# Patient Record
Sex: FEMALE | Race: BLACK OR AFRICAN AMERICAN | Employment: FULL TIME | URBAN - METROPOLITAN AREA
[De-identification: names, ages, dates, MRNs, and addresses within clinical notes are randomized per-mention and may not be internally consistent; named-entity substitution may affect disease eponyms.]

---

## 2017-06-16 ENCOUNTER — TELEPHONE (OUTPATIENT)
Dept: NEUROLOGY | Age: 37
End: 2017-06-16

## 2017-06-16 NOTE — TELEPHONE ENCOUNTER
Amrita says the patient is being referred by Vicky Steward to Dr. Dellie Cushing, she needs the last couple of notes and any lab/radiology reports faxed to [phone]. (I would have done this myself but I don't see an order and this patient hasn't been seen here since last year) Please advise.

## 2018-06-20 ENCOUNTER — HOSPITAL ENCOUNTER (OUTPATIENT)
Dept: ULTRASOUND IMAGING | Age: 38
Discharge: HOME | End: 2018-06-20
Attending: INTERNAL MEDICINE
Payer: COMMERCIAL

## 2018-06-20 DIAGNOSIS — R10.13 DYSPEPSIA: ICD-10-CM

## 2018-06-20 DIAGNOSIS — R11.0 NAUSEA: ICD-10-CM

## 2018-06-20 PROCEDURE — 76700 US EXAM ABDOM COMPLETE: CPT

## 2018-09-24 ENCOUNTER — OFFICE VISIT (OUTPATIENT)
Dept: NEUROLOGY | Age: 38
End: 2018-09-24

## 2018-09-24 VITALS
SYSTOLIC BLOOD PRESSURE: 132 MMHG | HEIGHT: 62 IN | DIASTOLIC BLOOD PRESSURE: 80 MMHG | WEIGHT: 203.9 LBS | BODY MASS INDEX: 37.52 KG/M2 | HEART RATE: 88 BPM | OXYGEN SATURATION: 98 %

## 2018-09-24 DIAGNOSIS — G51.31 CLONIC HEMIFACIAL SPASM OF MUSCLE OF RIGHT SIDE OF FACE: ICD-10-CM

## 2018-09-24 DIAGNOSIS — R25.9 ABNORMAL INVOLUNTARY MOVEMENT: Primary | ICD-10-CM

## 2018-09-24 PROBLEM — E66.01 SEVERE OBESITY (BMI 35.0-39.9): Status: ACTIVE | Noted: 2018-09-24

## 2018-09-24 RX ORDER — ALPRAZOLAM 0.25 MG/1
0.25 TABLET ORAL
Qty: 40 TAB | Refills: 0 | Status: SHIPPED | OUTPATIENT
Start: 2018-09-24 | End: 2020-10-01

## 2018-09-24 NOTE — MR AVS SNAPSHOT
[address] [place] [address], 
[id number], [address] [facility] 
[phone] Patient: Vertie Dubin MRN: [id number] :1980 Visit Information Date & Time Provider Department Dept. Phone Encounter #  
 2018  1:40 PM Onel Reynoso MD [facility] [phone] [id number] Follow-up Instructions Return if symptoms worsen or fail to improve, for abnormal movements. Upcoming Health Maintenance Date Due DTaP/Tdap/Td series (1 - Tdap) 10/23/2000 Influenza Age 5 to Adult 2018 PAP AKA CERVICAL CYTOLOGY 2019 Allergies as of 2018  Review Complete On: 2018 By: Ailin Norton Severity Noted Reaction Type Reactions Nubain [Nalbuphine]  2011    Swelling Of throat and tongue Phenergan [Promethazine]  2011    Swelling Caused swelling tongue and throat Stadol [Butorphanol Tartrate]  2011    Swelling Of tongue and throat Current Immunizations  Never Reviewed No immunizations on file. Not reviewed this visit You Were Diagnosed With   
  
 Codes Comments Abnormal involuntary movement    -  Primary ICD-10-CM: R25.9 ICD-9-CM: 460. 0 Clonic hemifacial spasm of muscle of right side of face     ICD-10-CM: G51.3 ICD-9-CM: 351.8 Vitals BP Pulse Height(growth percentile) Weight(growth percentile) SpO2 BMI  
 132/80 88 5' 2\" (1.575 m) 203 lb 14.4 oz (92.5 kg) 98% 37.29 kg/m2 OB Status Smoking Status Having regular periods Never Smoker BMI and BSA Data Body Mass Index Body Surface Area  
 37.29 kg/m 2 2.01 m 2 Preferred Pharmacy Pharmacy Name Phone [facility] [address] [place] [address] Drive [phone] Your Updated Medication List  
  
   
 This list is accurate as of 9/24/18  2:21 PM.  Always use your most recent med list.  
  
  
  
  
 ALPRAZolam 0.25 mg tablet Commonly known as:  Kaleta Annel Take 1 Tab by mouth three (3) times daily as needed. Max Daily Amount: 0.75 mg.  
  
 aspirin-acetaminophen-caffeine 250-250-65 mg per tablet Commonly known as:  EXCEDRIN ES Take 1 Tab by mouth every six (6) hours as needed for Headache. Blood-Glucose Meter monitoring kit Commonly known as:  [address]   [address] KIT Check fasting morning blood sugar once/day  
  
 butalbital-aspirin-caffeine capsule Commonly known as:  Donte Francis Take 1 Cap by mouth every six (6) hours as needed for Headache. Max Daily Amount: 4 Caps. fluticasone 50 mcg/actuation nasal spray Commonly known as:  Martell Bolivar 2 Sprays by Both Nostrils route daily. Indications: ALLERGIC RHINITIS  
  
 glucose blood VI test strips strip Commonly known as:  ONETOUCH ULTRA TEST Check fasting morning blood sugar once/day * Lancets Misc Commonly known as:  FREESTYLE LANCETS Check fasting sugar in the morning one time/day. * Lancets Misc Commonly known as:  ONETOUCH ULTRASOFT LANCETS Check fasting morning blood sugar once/day. metFORMIN 500 mg tablet Commonly known as:  GLUCOPHAGE Take 1 Tab by mouth two (2) times daily (with meals). Indications: TYPE 2 DIABETES MELLITUS  
  
 ondansetron 8 mg disintegrating tablet Commonly known as:  ZOFRAN ODT Take 1 Tab by mouth every twelve (12) hours as needed for Nausea. pantoprazole 40 mg tablet Commonly known as:  PROTONIX Take 1 Tab by mouth daily. * Notice: This list has 2 medication(s) that are the same as other medications prescribed for you. Read the directions carefully, and ask your doctor or other care provider to review them with you. Prescriptions Printed Refills  ALPRAZolam (XANAX) 0.25 mg tablet 0  
 Sig: Take 1 Tab by mouth three (3) times daily as needed. Max Daily Amount: 0.75 mg. Class: Print Route: Oral  
  
We Performed the Following REFERRAL TO NEUROLOGY [ZOS98 Custom] Comments:  
 Movement disorder specialist for involuntary movements or dystonia affecting right face, right arm/leg and back. Follow-up Instructions Return if symptoms worsen or fail to improve, for abnormal movements. Referral Information Referral ID Referred By Referred To  
  
 [id number] Sarah SALCEDO Not Available Visits Status Start Date End Date 1 New Request 9/24/18 9/24/19 If your referral has a status of pending review or denied, additional information will be sent to support the outcome of this decision. Patient Instructions A Healthy Lifestyle: Care Instructions Your Care Instructions A healthy lifestyle can help you feel good, stay at a healthy weight, and have plenty of energy for both work and play. A healthy lifestyle is something you can share with your whole family. A healthy lifestyle also can lower your risk for serious health problems, such as high blood pressure, heart disease, and diabetes. You can follow a few steps listed below to improve your health and the health of your family. Follow-up care is a key part of your treatment and safety. Be sure to make and go to all appointments, and call your doctor if you are having problems. It's also a good idea to know your test results and keep a list of the medicines you take. How can you care for yourself at home? · Do not eat too much sugar, fat, or fast foods. You can still have dessert and treats now and then. The goal is moderation. · Start small to improve your eating habits. Pay attention to portion sizes, drink less juice and soda pop, and eat more fruits and vegetables. ¨ Eat a healthy amount of food.  A 3-ounce serving of meat, for example, is about the size of a deck of cards. Fill the rest of your plate with vegetables and whole grains. ¨ Limit the amount of soda and sports drinks you have every day. Drink more water when you are thirsty. ¨ Eat at least 5 servings of fruits and vegetables every day. It may seem like a lot, but it is not hard to reach this goal. A serving or helping is 1 piece of fruit, 1 cup of vegetables, or 2 cups of leafy, raw vegetables. Have an apple or some carrot sticks as an afternoon snack instead of a candy bar. Try to have fruits and/or vegetables at every meal. 
· Make exercise part of your daily routine. You may want to start with simple activities, such as walking, bicycling, or slow swimming. Try to be active 30 to 60 minutes every day. You do not need to do all 30 to 60 minutes all at once. For example, you can exercise 3 times a day for 10 or 20 minutes. Moderate exercise is safe for most people, but it is always a good idea to talk to your doctor before starting an exercise program. 
· Keep moving. Parminder Sang the lawn, work in the garden, or [employer]. Take the stairs instead of the elevator at work. · If you smoke, quit. People who smoke have an increased risk for heart attack, stroke, cancer, and other lung illnesses. Quitting is hard, but there are ways to boost your chance of quitting tobacco for good. ¨ Use nicotine gum, patches, or lozenges. ¨ Ask your doctor about stop-smoking programs and medicines. ¨ Keep trying. In addition to reducing your risk of diseases in the future, you will notice some benefits soon after you stop using tobacco. If you have shortness of breath or asthma symptoms, they will likely get better within a few weeks after you quit. · Limit how much alcohol you drink. Moderate amounts of alcohol (up to 2 drinks a day for men, 1 drink a day for women) are okay. But drinking too much can lead to liver problems, high blood pressure, and other health problems. Family health If you have a family, there are many things you can do together to improve your health. · Eat meals together as a family as often as possible. · Eat healthy foods. This includes fruits, vegetables, lean meats and dairy, and whole grains. · Include your family in your fitness plan. Most people think of activities such as jogging or tennis as the way to fitness, but there are many ways you and your family can be more active. Anything that makes you breathe hard and gets your heart pumping is exercise. Here are some tips: 
¨ Walk to do errands or to take your child to school or the bus. ¨ Go for a family bike ride after dinner instead of watching TV. Where can you learn more? Go to http://ok-keri.info/. Enter Z540 in the search box to learn more about \"A Healthy Lifestyle: Care Instructions. \" Current as of: December 7, 2017 Content Version: 11.7 © 2299-9916 [employer]. Care instructions adapted under license by [employer] (which disclaims liability or warranty for this information). If you have questions about a medical condition or this instruction, always ask your healthcare professional. [address] any warranty or liability for your use of this information. Introducing [facility]! Dear Mark Mehta: Thank you for requesting a [employer] account. Our records indicate that you already have an active [employer] account. You can access your account anytime at [email or web address] Did you know that you can access your hospital and ER discharge instructions at any time in [employer]? You can also review all of your test results from your hospital stay or ER visit. Additional Information If you have questions, please visit the Frequently Asked Questions section of the [employer] website at [email or web address] Remember, [employer] is NOT to be used for urgent needs.  For medical emergencies, dial 911. Now available from your iPhone and Android! Please provide this summary of care documentation to your next provider. Your primary care clinician is listed as Olvin Cm. If you have any questions after today's visit, please call [phone].

## 2018-09-24 NOTE — PATIENT INSTRUCTIONS

## 2018-09-24 NOTE — PROGRESS NOTES
NEUROLOGY CLINIC NOTE Patient ID: 
Ana M Degroot [id number] 
44 y.o. 
1980 Date of Consultation:  September 24, 2018 Reason for Consultation:  Establish care Chief Complaint Patient presents with  Follow-up Face/spine back History of Present Illness:  
 
Patient Active Problem List  
 Diagnosis Date Noted  Severe obesity (BMI 35.0-39.9) ([facility] 09/24/2018  Hyperlipidemia LDL goal <70 10/03/2016  Precordial pain 07/18/2016  TIA (transient ischemic attack) 06/07/2016  Dyslipidemia 06/05/2013  Weight gain 05/22/2013  Elbow pain 05/22/2013  Impacted cerumen of right ear 05/22/2013  Hearing loss 05/22/2013  Dyspnea 06/15/2011  Anxiety 06/15/2011 Past Medical History:  
Diagnosis Date  Diabetes ([facility]  Hyperlipidemia  TIA (transient ischemic attack) 6/7/2016 Past Surgical History:  
Procedure Laterality Date  HX TUBAL LIGATION  2009  HX WISDOM TEETH EXTRACTION Prior to Admission medications Medication Sig Start Date End Date Taking? Authorizing Provider  
metFORMIN (GLUCOPHAGE) 500 mg tablet Take 1 Tab by mouth two (2) times daily (with meals). Indications: TYPE 2 DIABETES MELLITUS 10/3/16  Yes Marni Ryan MD  
Lancets (FREESTYLE LANCETS) misc Check fasting sugar in the morning one time/day. 10/3/16  Yes Olvin Cm MD  
Lancets (ONETOUCH ULTRASOFT LANCETS) misc Check fasting morning blood sugar once/day. 10/3/16  Yes Marni Ryan MD  
glucose blood VI test strips (ONETOUCH ULTRA TEST) strip Check fasting morning blood sugar once/day 10/3/16  Yes Marni Ryan MD  
pantoprazole (PROTONIX) 40 mg tablet Take 1 Tab by mouth daily. 9/23/16  Yes Olvin Cm MD  
ondansetron (ZOFRAN ODT) 8 mg disintegrating tablet Take 1 Tab by mouth every twelve (12) hours as needed for Nausea.  8/29/16  Yes Olvin Cm MD  
ALPRAZolam (XANAX) 0.25 mg tablet Take 1 Tab by mouth three (3) times daily as needed for Anxiety. Max Daily Amount: 0.75 mg. 6/30/16  Yes Miki Meyer NP  
butalbital-aspirin-caffeine [facility]) capsule Take 1 Cap by mouth every six (6) hours as needed for Headache. Max Daily Amount: 4 Caps. 6/7/16  Yes Vamshi Dasilva NP  
aspirin-acetaminophen-caffeine (EXCEDRIN ES) 155-188-36 mg per tablet Take 1 Tab by mouth every six (6) hours as needed for Headache. Yes Historical Provider  
fluticasone (FLONASE) 50 mcg/actuation nasal spray 2 Sprays by Both Nostrils route daily. Indications: ALLERGIC RHINITIS Patient taking differently: 2 Sprays by Both Nostrils route daily as needed. Indications: ALLERGIC RHINITIS 11/11/15  Yes Karina Victoria MD  
Blood-Glucose Meter (ONETOUCH ULTRA SYSTEM KIT) monitoring kit Check fasting morning blood sugar once/day 10/3/16   Karina Victoria MD  
 
Allergies Allergen Reactions  Nubain [Nalbuphine] Swelling Of throat and tongue  Phenergan [Promethazine] Swelling Caused swelling tongue and throat  Stadol [Butorphanol Tartrate] Swelling Of tongue and throat Social History Substance Use Topics  Smoking status: Never Smoker  Smokeless tobacco: Never Used  Alcohol use 0.0 oz/week  
  0 Standard drinks or equivalent per week Comment: Occasional  
  
Family History Problem Relation Age of Onset  Hypertension Mother  Elevated Lipids Mother  Neuropathy Mother  Headache Father  Hypertension Brother  Stroke Maternal Aunt  Hypertension Maternal Grandmother  Cancer Maternal Grandmother   
  throat cancer  Stroke Maternal Uncle Subjective:  
  
Jose Fowler is a 44 y.o. RHAA female with chronic history of migraine headaches who is here to establish her care regarding her right episodic 
face, neck and arm issues. Patient was last seen by me at [facility] neurology on 6/19/2017 for the same issues.  Review of records reveal that patient was seen previously by Dr. Alisa Wilkinson (neurology) last 6/9/2016. Follow up of possible TIA. History of menstrual migraines. Symptoms started 2 weeks prior. Gets a wave that overcomes her. Gets a flutter in her chest and shortness of breath. Dizziness, weakness R>L arms and fatigue. Tingling, twitching, right facial droop and slurred speech. Pain in the back of her neck. Problems swallowing. Admitted at [facility] 6/6/2017. Head CT was negative. Brain MRI with and without contrast done 6/7/2016 revealed no acute abnormality and incidentally noted an empty sella. Carotid doppler 6/7/2016 revealed <50% bilateral ICA stenosis. CBC reveal slightly low hgb (11.3), CMP showing low K (3.4) and slightly elevated LDL at 101.8. Slightly 
elevated hgbA1c at 6.4. Unremarkable troponin, D-dimer, HCG, EKG was normal. Discharged on Topiramate and Fioricet without any benefit. She apparently had a witnessed spell in her clinic. Patient became tearful, then right side of her face tightened up. Unable to raise her right eyebrow and her smile was asymmetric. Visibly anxious. Topamax was discontinued. Started on Gabapentin 300 mg at bedtime. Alprazolam as needed. 24 hours ambulatory EEG was done and normal except for muscle artifact. Cervical MRI was denied. Autoimmune and myasthenia gravis panel were negative. Seen by Hilda Lennon NP with [facility] last 6/30/2016 for follow up. Some benefit with Excedrin. Patient was also on Gabapentin 900 mg at bedtime and was not having anymore spells. She took Xanax when she got short of breath and it prevented the escalation of her symptoms. Note mentions waking up gasping for breath and snoring when she sleeps. Possible anxiety related. Trial of Lexapro and referral to psychiatry. Per patient after that she was better. Symptoms resolved. Then it recurred about a 5/2017. No known precipitant. Right facial asymmetry. Eyelid would droop and facial droop. Right neck pain tightness and back of the eye (pressure dull pain). Associated with speech changes. Feels like her throat is tight. Right arm pain. Right leg has a numb and heavy feeling. 5/10 in intensity pain. Lasts for 30 minutes. Afterwards she feels fatigued and tired the whole day. Symptoms can occur separately and not in toto. Like right now she only has right arm pain (deep ache) whole arm. She notes problems with use of the arm and hand. No actual loss of muscle bulk or atrophy however. Ibuprofen offers some relief. She also mentions episodes of waking up at night with both legs are numb. 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 Problems staying asleep. Feels the need to walk around for relief of her legs. Sleeps on the right side majority of the time. 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 Prescribed Baclofen 10 mg at bedtime and up to TID which she did not take. MRI of the neck and soft tissue 7/3/2017 for right facial pulling, throat closing, neck tightness and RUE pain/heaviness revealed \"Empty\" sella, borderline cerebellar tonsil position without overt Chiari I configuration. No significant abnormality along the aerodigestive structures. Limited evaluation of the muscular structures of the neck shows no overt abnormality. Muscular evaluation is limited in the absence of STIR or fatsuppressed T2 imaging. No evidence of adenopathy. She did not return for follow up visits. Per patient since then, she was okay with only mild spell of intermittent right eye closure until 9 days PTC with it all recurred again.  has a video of one of the spells. It consists of her right eyelid closing and right facial pulling with left eye open. Lasting for several seconds and wipes her right eye after.  Per patient she is currently having intermittent right arm and back pain. Right leg numbness and heaviness. Problems walking. Issues with swallowing and breathing causing problems with sleeping. Speech and cognitive function is off. Also note back of the head pain. She took Ajungo powder without relief. Spells mainly triggered when she is stressed or when it is too hot. Currently c/o 8/10 back pain. Outside reports reviewed: office notes, ER records, historical medical records, MRI, head CT, labs Review of Systems: A comprehensive review of systems was performed:  
Constitutional: positive for none Eyes: positive for blurred vision, eye pain Ears, nose, mouth, throat, and face: positive for ear pain, hearing loss, tinnitus, congestion Respiratory: positive for shortness of breath Cardiovascular: positive for chest pain Gastrointestinal: positive for nausea, constipation, heartburn, trouble swallowing Genitourinary: positive for none Integument/breast: positive for rash, itching Hematologic/lymphatic: positive for easy bruising Musculoskeletal: positive for joint pain, myalgia, leg cramps, weakness, bone pain, back pain Neurological: positive for headaches, numbness, dizziness, balance issues, speech issues Behavioral/Psych: positive for anxiety, depression, panic attacks Endocrine: positive for increased thirst 
Allergic/Immunologic: positive for none Objective:  
 
Visit Vitals  /80  Pulse 88  Ht 5' 2\" (1.575 m)  Wt 203 lb 14.4 oz (92.5 kg)  SpO2 98%  BMI 37.29 kg/m2 PHYSICAL EXAM: 
 
NEUROLOGICAL EXAM: 
 
Appearance: The patient is obese, provides a coherent history and is in no acute distress. Mental Status: Oriented to time, place and person. Fluent, no aphasia or dysarthria. Mood and affect appropriate. Cranial Nerves:   Intact visual fields. EDUARDO, EOM's full, no nystagmus, no ptosis. Facial sensation is normal. Corneal reflexes are intact.  Facial movement is symmetric. Hearing is normal bilaterally. Palate is midline with normal elevation. Sternocleidomastoid and trapezius muscles are normal. Tongue is midline. Motor:  5/5 strength. Normal bulk and tone. No pronator drift. Reflexes:   Deep tendon reflexes symmetrical.   
Sensory:   Intact. Gait:  Steady. No Romberg. Tremor:   No tremor noted. Cerebellar:  Intact FTN/KATRIN/HTS. Assessment:  
Abnormal involuntary movement - worsening Right hemifacial spasm - worsening Plan:  
Neurological examination is nonfocal.  No indication currently to do any other neuroimaging. Patient has had brain MRI with and without contrast done as well as MRI of the neck soft tissue which were unremarkable. Episodes are more suspicious for abnormal involuntary movements or conditions such as can be seen in dystonias. Summarize again for the patient that extensive workup previously done. Patient needs further evaluation from a movement disorder specialist to look for additional workup including possible genetic testing as well as treatment options. Patient was referred to movement specialist either with Dr. Salo Mccormack of neurological associates or [facility] movement disorders clinic. In the interim, suggested the patient to do symptomatic treatment with medication that may help with involuntary movements. Patient agreed. Trial of alprazolam 0.25 mg every 8 hours as needed. Prescription was provided. Other treatment options include Sinemet, dopamine agonists or antispasm medication such as baclofen and tizanidine. Visit he was shown reveals intermittent right hemifacial spasm. It was explained to the patient that unfortunately this does not seem to be occurring in isolation but may be part of a more extensive symptomatology. If truly isolated then treatment such as Botox may be an option. The movements are also suspicious of a more voluntary nature.   Given that stress may be a trigger one may also need to look at for possible nonphysiologic causes of her condition. All questions and concerns were answered.

## 2018-09-27 ENCOUNTER — TELEPHONE (OUTPATIENT)
Dept: NEUROLOGY | Age: 38
End: 2018-09-27

## 2018-09-27 NOTE — TELEPHONE ENCOUNTER
Spoke with patient's  Jose Quiles to let him known I have faxed the referral to Dr. Carrasco Speaks. Patient want to known the wait time and Dr. Francisco Tena office time frame is 4-6 weeks. I did let  known that [facility] time frame was out to February 1, 2019. Gave  the phone number and he understood.

## 2018-10-02 ENCOUNTER — DOCUMENTATION ONLY (OUTPATIENT)
Dept: NEUROLOGY | Age: 38
End: 2018-10-02

## 2018-10-02 NOTE — PROGRESS NOTES
Faxed on 9/26/2018 to Dr. Marycruz Orosco at [phone] Referral Requisition, Clinic Note, Insurance card image, Patient Registration.

## 2018-11-07 ENCOUNTER — TELEPHONE (OUTPATIENT)
Dept: NEUROLOGY | Age: 38
End: 2018-11-07

## 2018-11-07 NOTE — TELEPHONE ENCOUNTER
Patient is now under the care of Dr. Niya Ocampo ([facility]). Please have the patient request refills from him.

## 2018-11-07 NOTE — TELEPHONE ENCOUNTER
----- Message from Zeke Lowe sent at 11/7/2018 10:41 AM EST -----  Regarding: Dr Hermosillo Go refill  The pt called to put in a refill request for her \"alprazolam 0.25mg\" medication. The Rx should be sent to the [facility] on file.     Best contact number is [phone]

## 2018-11-07 NOTE — TELEPHONE ENCOUNTER
Spoke with patient and per Dr. Philly Ortiz patient is in the care of Dr. Orlando Brown ([facility]). Please have the patient request refill from him. Patient understood.

## 2018-11-21 ENCOUNTER — OFFICE VISIT (OUTPATIENT)
Dept: PRIMARY CARE CLINIC | Age: 38
End: 2018-11-21

## 2018-11-21 VITALS
TEMPERATURE: 98.6 F | OXYGEN SATURATION: 97 % | HEIGHT: 62 IN | HEART RATE: 76 BPM | WEIGHT: 194.2 LBS | RESPIRATION RATE: 16 BRPM | BODY MASS INDEX: 35.74 KG/M2 | SYSTOLIC BLOOD PRESSURE: 116 MMHG | DIASTOLIC BLOOD PRESSURE: 77 MMHG

## 2018-11-21 DIAGNOSIS — E11.9 TYPE 2 DIABETES MELLITUS WITHOUT COMPLICATION, WITHOUT LONG-TERM CURRENT USE OF INSULIN (HCC): Primary | ICD-10-CM

## 2018-11-21 DIAGNOSIS — K21.9 GASTROESOPHAGEAL REFLUX DISEASE WITHOUT ESOPHAGITIS: ICD-10-CM

## 2018-11-21 DIAGNOSIS — G51.39 FACIAL SPASM: ICD-10-CM

## 2018-11-21 LAB — HBA1C MFR BLD HPLC: 7.9 %

## 2018-11-21 RX ORDER — LANCETS
EACH MISCELLANEOUS
Qty: 1 EACH | Refills: 11 | Status: SHIPPED | OUTPATIENT
Start: 2018-11-21

## 2018-11-21 RX ORDER — LANSOPRAZOLE 30 MG/1
CAPSULE, DELAYED RELEASE ORAL
COMMUNITY
End: 2022-09-06 | Stop reason: SDUPTHER

## 2018-11-21 RX ORDER — INSULIN PUMP SYRINGE, 3 ML
EACH MISCELLANEOUS
Qty: 1 KIT | Refills: 0 | Status: SHIPPED | OUTPATIENT
Start: 2018-11-21 | End: 2019-02-05 | Stop reason: SDUPTHER

## 2018-11-21 RX ORDER — METFORMIN HYDROCHLORIDE 500 MG/1
TABLET, EXTENDED RELEASE ORAL
Qty: 60 TAB | Refills: 2 | Status: SHIPPED | OUTPATIENT
Start: 2018-11-21 | End: 2018-12-17 | Stop reason: SDUPTHER

## 2018-11-21 RX ORDER — NORETHINDRONE ACETATE AND ETHINYL ESTRADIOL, ETHINYL ESTRADIOL AND FERROUS FUMARATE 1MG-10(24)
KIT ORAL
Refills: 4 | COMMUNITY
Start: 2018-09-16 | End: 2022-03-10 | Stop reason: ALTCHOICE

## 2018-11-21 RX ORDER — TRIHEXYPHENIDYL HYDROCHLORIDE 2 MG/5ML
SYRUP ORAL
Refills: 11 | COMMUNITY
Start: 2018-10-26 | End: 2022-03-10 | Stop reason: ALTCHOICE

## 2018-11-21 NOTE — PROGRESS NOTES
Chief Complaint   Patient presents with    Complete Physical     ate boiled eggs at 830am    Diabetes     tightness in arm, vision is blurry, ears are popping, pt states she thinks her sugars are up again      1. Have you been to the ER, urgent care clinic since your last visit? Hospitalized since your last visit? Yes, tightness in left arm and chest, went to [facility] 11/19/18    2. Have you seen or consulted any other health care providers outside of the [address] [place] [address] since your last visit? Include any pap smears or colon screening.  No

## 2018-11-21 NOTE — PROGRESS NOTES
Subjective:     Chief Complaint   Patient presents with    Complete Physical     ate boiled eggs at 830am    Diabetes     tightness in arm, vision is blurry, ears are popping, pt states she thinks her sugars are up again         She  is a 43y.o. year old female who presents today after two years for follow up on diabetes. On 10/2016 she was diagnosed with DM-2 with A1c of 7.5. She reports that she  took Metformin for a month only then she stopped. She is here today with a concern about feeling tired, achy ness, increased thirst and urination. She also reports that she has been having blurry vision as well. Eye exam was normal per patient. She is currently seeing movement disorder specialist Dr. Raul Ball. I reviewed the neurology notes in the chart. Patient reports that she is not having that much twitching spells in her right side of the face since she started taking Artane and Xanax. GERD symptoms been well controlled with Lansoprazole. .    She denies any chest pain, cough, palpitation, abdominal pain. Positive for achy ness in back, shoulder. Pertinent items are noted in HPI.   Objective:     Vitals:    11/21/18 1336   BP: 116/77   Pulse: 76   Resp: 16   Temp: 98.6 °F (37 °C)   TempSrc: Oral   SpO2: 97%   Weight: 194 lb 3.2 oz (88.1 kg)   Height: 5' 2\" (1.575 m)       Physical Examination: General appearance - alert, well appearing, and in no distress, oriented to person, place, and time and overweight  Mental status - alert, oriented to person, place, and time, normal mood, behavior, speech, dress, motor activity, and thought processes  Ears - bilateral TM's and external ear canals normal  Nose - normal and patent, no erythema, discharge or polyps  Mouth - mucous membranes moist, pharynx normal without lesions  Neck - supple, no significant adenopathy, thyroid exam: thyroid is normal in size without nodules or tenderness  Chest - clear to auscultation, no wheezes, rales or rhonchi, symmetric air entry  Heart - normal rate, regular rhythm, normal S1, S2, no murmurs, rubs, clicks or gallops  Extremities - no pedal edema noted  Neuro: no focal asymmetry. Allergies   Allergen Reactions    Nubain [Nalbuphine] Swelling     Of throat and tongue    Phenergan [Promethazine] Swelling     Caused swelling tongue and throat    Stadol [Butorphanol Tartrate] Swelling     Of tongue and throat      Social History     Socioeconomic History    Marital status:      Spouse name: Not on file    Number of children: Not on file    Years of education: Not on file    Highest education level: Not on file   Tobacco Use    Smoking status: Never Smoker    Smokeless tobacco: Never Used   Substance and Sexual Activity    Alcohol use: Yes     Alcohol/week: 0.0 oz     Comment: Occasional    Drug use: No    Sexual activity: Yes     Partners: Male     Birth control/protection: Surgical      Family History   Problem Relation Age of Onset    Hypertension Mother     Elevated Lipids Mother     Neuropathy Mother     Headache Father     Hypertension Brother     Stroke Maternal Aunt     Hypertension Maternal Grandmother     Cancer Maternal Grandmother         throat cancer    Stroke Maternal Uncle       Past Surgical History:   Procedure Laterality Date    HX TUBAL LIGATION  2009    HX WISDOM TEETH EXTRACTION        Past Medical History:   Diagnosis Date    [facility]     Hyperlipidemia     TIA (transient ischemic attack) 6/7/2016      Current Outpatient Medications   Medication Sig Dispense Refill    LO LOESTRIN FE 1 mg-10 mcg (24)/10 mcg (2) tab TK 1 T PO QD  4    trihexyphenidyl (ARTANE) 0.4 mg/mL elix TK 5 ML PO BID  11    lansoprazole (PREVACID) 30 mg capsule Take  by mouth Daily (before breakfast).  metFORMIN ER (GLUCOPHAGE XR) 500 mg tablet One tab daily with breakfast and dinner. 60 Tab 2    ALPRAZolam (XANAX) 0.25 mg tablet Take 1 Tab by mouth three (3) times daily as needed. Max Daily Amount: 0.75 mg. 40 Tab 0    butalbital-aspirin-caffeine (FIORINAL) capsule Take 1 Cap by mouth every six (6) hours as needed for Headache. Max Daily Amount: 4 Caps. 20 Cap 0    aspirin-acetaminophen-caffeine (EXCEDRIN ES) 250-250-65 mg per tablet Take 1 Tab by mouth every six (6) hours as needed for Headache.  fluticasone (FLONASE) 50 mcg/actuation nasal spray 2 Sprays by Both Nostrils route daily. Indications: ALLERGIC RHINITIS (Patient taking differently: 2 Sprays by Both Nostrils route daily as needed. Indications: ALLERGIC RHINITIS) 1 Bottle 2    Lancets (FREESTYLE LANCETS) misc Check fasting sugar in the morning one time/day. 1 Each 11    Lancets (ONETOUCH ULTRASOFT LANCETS) misc Check fasting morning blood sugar once/day. 1 Each 11    Blood-Glucose Meter (ONETOUCH ULTRA SYSTEM KIT) monitoring kit Check fasting morning blood sugar once/day 1 Kit 0    glucose blood VI test strips (ONETOUCH ULTRA TEST) strip Check fasting morning blood sugar once/day 60 Strip 11    ondansetron (ZOFRAN ODT) 8 mg disintegrating tablet Take 1 Tab by mouth every twelve (12) hours as needed for Nausea. 10 Tab 0        Assessment/ Plan:   Diagnoses and all orders for this visit:    1. Type 2 diabetes mellitus without complication, without long-term current use of insulin (HCC)  -     AMB POC HEMOGLOBIN A1C is 7.9.  -    Start  metFORMIN ER (GLUCOPHAGE XR) 500 mg tablet; One tab daily with breakfast and dinner.  -     METABOLIC PANEL, BASIC  -     MICROALBUMIN, UR, RAND W/ MICROALB/CREAT RATIO  -     glucose blood VI test strips (ONETOUCH ULTRA TEST) strip; Check fasting morning blood sugar once/day  -     Blood-Glucose Meter (ONETOUCH ULTRA SYSTEM KIT) monitoring kit; Check fasting morning blood sugar once/day  -     Lancets misc; Check fasting sugar in the morning one time/day. -       Advised for yearly eye check up         -    foot care. - counseled of diet and exercise.         - will check lipid panel in next visit. 2. Gastroesophageal reflux disease without esophagitis       - stable with Lansoprazole. Avoid any triggering factors. 3. Facial spasm       - Stable at this point. Continue follow up with Dr. Ctaarino Dunn. Medication risks/benefits/costs/interactions/alternatives discussed with patient. Advised patient to call back or return to office if symptoms worsen/change/persist. If patient cannot reach us or should anything more severe/urgent arise he/she should proceed directly to the nearest emergency department. Discussed expected course/resolution/complications of diagnosis in detail with patient. Patient given a written after visit summary which includes her diagnoses, current medications and vitals. Patient expressed understanding with the diagnosis and plan. Follow-up Disposition:  Return in about 4 weeks (around 12/19/2018) for complete physical  and fasting blood work., have fasting blood work done 2-3 days prior. .    Discussed the patient's BMI with her. The BMI follow up plan is as follows:     dietary management education, guidance, and counseling  encourage exercise  monitor weight      An After Visit Summary was printed and given to the patient.

## 2018-11-22 PROBLEM — K21.9 GASTROESOPHAGEAL REFLUX DISEASE WITHOUT ESOPHAGITIS: Status: ACTIVE | Noted: 2018-11-22

## 2018-11-22 LAB
ALBUMIN/CREAT UR: <6.5 MG/G CREAT (ref 0–30)
BUN SERPL-MCNC: 9 MG/DL (ref 6–20)
BUN/CREAT SERPL: 14 (ref 9–23)
CALCIUM SERPL-MCNC: 9.3 MG/DL (ref 8.7–10.2)
CHLORIDE SERPL-SCNC: 100 MMOL/L (ref 96–106)
CO2 SERPL-SCNC: 24 MMOL/L (ref 20–29)
CREAT SERPL-MCNC: 0.64 MG/DL (ref 0.57–1)
CREAT UR-MCNC: 45.9 MG/DL
GLUCOSE SERPL-MCNC: 101 MG/DL (ref 65–99)
MICROALBUMIN UR-MCNC: <3 UG/ML
POTASSIUM SERPL-SCNC: 4 MMOL/L (ref 3.5–5.2)
SODIUM SERPL-SCNC: 138 MMOL/L (ref 134–144)

## 2018-11-22 NOTE — PATIENT INSTRUCTIONS
Body Mass Index: Care Instructions  Your Care Instructions    Body mass index (BMI) can help you see if your weight is raising your risk for health problems. It uses a formula to compare how much you weigh with how tall you are. · A BMI lower than 18.5 is considered underweight. · A BMI between 18.5 and 24.9 is considered healthy. · A BMI between 25 and 29.9 is considered overweight. A BMI of 30 or higher is considered obese. If your BMI is in the normal range, it means that you have a lower risk for weight-related health problems. If your BMI is in the overweight or obese range, you may be at increased risk for weight-related health problems, such as high blood pressure, heart disease, stroke, arthritis or joint pain, and diabetes. If your BMI is in the underweight range, you may be at increased risk for health problems such as fatigue, lower protection (immunity) against illness, muscle loss, bone loss, hair loss, and hormone problems. BMI is just one measure of your risk for weight-related health problems. You may be at higher risk for health problems if you are not active, you eat an unhealthy diet, or you drink too much alcohol or use tobacco products. Follow-up care is a key part of your treatment and safety. Be sure to make and go to all appointments, and call your doctor if you are having problems. It's also a good idea to know your test results and keep a list of the medicines you take. How can you care for yourself at home? · Practice healthy eating habits. This includes eating plenty of fruits, vegetables, whole grains, lean protein, and low-fat dairy. · If your doctor recommends it, get more exercise. Walking is a good choice. Bit by bit, increase the amount you walk every day. Try for at least 30 minutes on most days of the week. · Do not smoke. Smoking can increase your risk for health problems. If you need help quitting, talk to your doctor about stop-smoking programs and medicines. These can increase your chances of quitting for good. · Limit alcohol to 2 drinks a day for men and 1 drink a day for women. Too much alcohol can cause health problems. If you have a BMI higher than 25  · Your doctor may do other tests to check your risk for weight-related health problems. This may include measuring the distance around your waist. A waist measurement of more than 40 inches in men or 35 inches in women can increase the risk of weight-related health problems. · Talk with your doctor about steps you can take to stay healthy or improve your health. You may need to make lifestyle changes to lose weight and stay healthy, such as changing your diet and getting regular exercise. If you have a BMI lower than 18.5  · Your doctor may do other tests to check your risk for health problems. · Talk with your doctor about steps you can take to stay healthy or improve your health. You may need to make lifestyle changes to gain or maintain weight and stay healthy, such as getting more healthy foods in your diet and doing exercises to build muscle. Where can you learn more? Go to http://ok-keri.info/. Enter S176 in the search box to learn more about \"Body Mass Index: Care Instructions. \"  Current as of: October 13, 2016  Content Version: 11.4  © 9298-7613 [employer]. Care instructions adapted under license by [employer] (which disclaims liability or warranty for this information). If you have questions about a medical condition or this instruction, always ask your healthcare professional. [address] any warranty or liability for your use of this information.

## 2018-11-23 NOTE — PROGRESS NOTES
Please call patient and  mail letter:    1. Kidney function is normal.    2. Urine is negative for any protein. Follow up as scheduled in December.

## 2018-12-21 ENCOUNTER — OFFICE VISIT (OUTPATIENT)
Dept: PRIMARY CARE CLINIC | Age: 38
End: 2018-12-21

## 2018-12-21 VITALS
SYSTOLIC BLOOD PRESSURE: 127 MMHG | TEMPERATURE: 98.7 F | BODY MASS INDEX: 35.88 KG/M2 | RESPIRATION RATE: 16 BRPM | HEART RATE: 75 BPM | DIASTOLIC BLOOD PRESSURE: 83 MMHG | HEIGHT: 62 IN | OXYGEN SATURATION: 97 % | WEIGHT: 195 LBS

## 2018-12-21 DIAGNOSIS — E11.9 TYPE 2 DIABETES MELLITUS WITHOUT COMPLICATION, WITHOUT LONG-TERM CURRENT USE OF INSULIN (HCC): ICD-10-CM

## 2018-12-21 DIAGNOSIS — Z00.00 WELL ADULT ON ROUTINE HEALTH CHECK: Primary | ICD-10-CM

## 2018-12-21 DIAGNOSIS — J30.89 OTHER ALLERGIC RHINITIS: ICD-10-CM

## 2018-12-21 RX ORDER — METFORMIN HYDROCHLORIDE 500 MG/1
500 TABLET, EXTENDED RELEASE ORAL
Qty: 270 TAB | Refills: 0 | Status: SHIPPED | OUTPATIENT
Start: 2018-12-21 | End: 2020-05-27

## 2018-12-21 RX ORDER — FLUTICASONE PROPIONATE 50 MCG
2 SPRAY, SUSPENSION (ML) NASAL
Qty: 1 BOTTLE | Refills: 1 | Status: SHIPPED | OUTPATIENT
Start: 2018-12-21 | End: 2022-09-06 | Stop reason: SDUPTHER

## 2018-12-21 NOTE — PROGRESS NOTES
Chief Complaint   Patient presents with    Complete Physical     fasting, labs not done prior     1. Have you been to the ER, urgent care clinic since your last visit? Hospitalized since your last visit? No    2. Have you seen or consulted any other health care providers outside of the [address] [place] [address] since your last visit? Include any pap smears or colon screening.  No

## 2018-12-21 NOTE — PROGRESS NOTES
Subjective:   43 y.o. female for Well Woman Check as well as diabetes follow up. One month ago she was diagnosed with Metformin 500 mg BID. A1c was 7.9. She reports that FBS has been running upper 120-140s. Has been tolerating the metformin. No side effects noted. States that she has started going to diabetic educator. Pap is up to date. Need refill of Flonase. Patient Active Problem List   Diagnosis Code    Dyspnea R06.00    Anxiety F41.9    Weight gain R63.5    Elbow pain M25.529    Dyslipidemia E78.5    TIA (transient ischemic attack) G45.9    Precordial pain R07.2    Hyperlipidemia LDL goal <70 E78.5    Severe obesity (BMI 35.0-39. 9) E66.01    Type 2 diabetes mellitus without complication, without long-term current use of insulin (HCC) E11.9    Gastroesophageal reflux disease without esophagitis K21.9     Current Outpatient Medications   Medication Sig Dispense Refill    fluticasone (FLONASE) 50 mcg/actuation nasal spray 2 Sprays by Both Nostrils route daily as needed. 1 Bottle 1    metFORMIN ER (GLUCOPHAGE XR) 500 mg tablet Take 1 Tab by mouth three (3) times daily (with meals). TAKE 1 TABLET BY MOUTH TWICE DAILY WITH BREAKFAST AND DINNER 270 Tab 0    LO LOESTRIN FE 1 mg-10 mcg (24)/10 mcg (2) tab TK 1 T PO QD  4    lansoprazole (PREVACID) 30 mg capsule Take  by mouth Daily (before breakfast).  butalbital-aspirin-caffeine (FIORINAL) capsule Take 1 Cap by mouth every six (6) hours as needed for Headache. Max Daily Amount: 4 Caps. 20 Cap 0    aspirin-acetaminophen-caffeine (EXCEDRIN ES) 250-250-65 mg per tablet Take 1 Tab by mouth every six (6) hours as needed for Headache.       trihexyphenidyl (ARTANE) 0.4 mg/mL elix TK 5 ML PO BID  11    glucose blood VI test strips (ONETOUCH ULTRA TEST) strip Check fasting morning blood sugar once/day 60 Strip 11    Blood-Glucose Meter (ONETOUCH ULTRA SYSTEM KIT) monitoring kit Check fasting morning blood sugar once/day 1 Kit 0    Lancets misc Check fasting sugar in the morning one time/day. 1 Each 11    ALPRAZolam (XANAX) 0.25 mg tablet Take 1 Tab by mouth three (3) times daily as needed. Max Daily Amount: 0.75 mg. 40 Tab 0    Lancets (ONETOUCH ULTRASOFT LANCETS) [facility] Check fasting morning blood sugar once/day. 1 Each 11    ondansetron (ZOFRAN ODT) 8 mg disintegrating tablet Take 1 Tab by mouth every twelve (12) hours as needed for Nausea. 10 Tab 0     Allergies   Allergen Reactions    Nubain [Nalbuphine] Swelling     Of throat and tongue    Phenergan [Promethazine] Swelling     Caused swelling tongue and throat    Stadol [Butorphanol Tartrate] Swelling     Of tongue and throat     Past Medical History:   Diagnosis Date    Diabetes ([address]     Hyperlipidemia     TIA (transient ischemic attack) 6/7/2016     Past Surgical History:   Procedure Laterality Date    HX TUBAL LIGATION  2009    HX WISDOM TEETH EXTRACTION       Family History   Problem Relation Age of Onset    Hypertension Mother    [facility] Elevated Lipids Mother     Neuropathy Mother     Headache Father     Hypertension Brother     Stroke Maternal Aunt     Hypertension Maternal Grandmother     Cancer Maternal Grandmother         throat cancer    Stroke Maternal Uncle      Social History     Tobacco Use    Smoking status: Never Smoker    Smokeless tobacco: Never Used   Substance Use Topics    Alcohol use: Yes     Alcohol/week: 0.0 oz     Comment: Occasional             ROS: Feeling generally well. No TIA's or unusual headaches, no dysphagia. No prolonged cough. No dyspnea or chest pain on exertion. No abdominal pain, change in bowel habits, black or bloody stools. No urinary tract symptoms. No new or unusual musculoskeletal symptoms. Specific concerns today: refer to HPI. Objective: The patient appears well, alert, oriented x 3, in no distress.   Visit Vitals  /83 (BP 1 Location: Left arm, BP Patient Position: Sitting)   Pulse 75   Temp 98.7 °F (37.1 °C) (Oral) Resp 16   Ht 5' 2\" (1.575 m)   Wt 195 lb (88.5 kg)   LMP 12/04/2018 (Within Days)   SpO2 97%   BMI 35.67 kg/m²     ENT normal.  Neck supple. No adenopathy or thyromegaly. EDUARDO. Lungs are clear, good air entry, no wheezes, rhonchi or rales. S1 and S2 normal, no murmurs, regular rate and rhythm. Abdomen soft without tenderness, guarding, mass or organomegaly. Extremities show no edema, normal peripheral pulses. Neurological is normal, no focal findings. Foot exam\" 2 + pulses. Both feet looks normal. Monofilament test is normal.  Breast and Pelvic exams are deferred. Assessment/Plan:   Well Woman  lose weight, increase physical activity, follow low fat diet, follow low salt diet, routine labs ordered, call if any problems    ICD-10-CM ICD-9-CM    1. Well adult on routine health check Z00.00 V70.0 CBC WITH AUTOMATED DIFF      LIPID PANEL      METABOLIC PANEL, COMPREHENSIVE      TSH AND FREE T4   2. Type 2 diabetes mellitus without complication, without long-term current use of insulin ([facility]) E11.9 250.00 metFORMIN ER (GLUCOPHAGE XR) 500 mg tablet   3. Other allergic rhinitis J30.89 477.8 fluticasone (FLONASE) 50 mcg/actuation nasal spray     Diagnoses and all orders for this visit:    1. Well adult on routine health check  -     CBC WITH AUTOMATED DIFF  -     LIPID PANEL  -     METABOLIC PANEL, COMPREHENSIVE  -     TSH AND FREE T4    2. Type 2 diabetes mellitus without complication, without long-term current use of insulin ([facility])  -     FBS is uncontrolled. Increase metFORMIN ER (GLUCOPHAGE XR) 500 mg tablet; Take 1 Tab by mouth three (3) times daily (with meals). TAKE 1 TABLET BY MOUTH TWICE DAILY WITH BREAKFAST AND DINNER  -   Continue follow up with diabetic educator. -  Diabetic foot exam  3. Other allergic rhinitis  -     fluticasone (FLONASE) 50 mcg/actuation nasal spray; 2 Sprays by Both Nostrils route daily as needed.       Follow-up Disposition:  Return in about 2 months (around 2/21/2019) for Bring diabetic log book. .  reviewed diet, exercise and weight control  specific diabetic recommendations: referral to Diabetic Education department, low cholesterol diet, weight control and daily exercise discussed, all medications, side effects and compliance discussed carefully, foot care discussed and Podiatry visits discussed, annual eye examinations at Ophthalmology discussed and glycohemoglobin and other lab monitoring discussed

## 2018-12-22 LAB
ALBUMIN SERPL-MCNC: 4.5 G/DL (ref 3.5–5.5)
ALBUMIN/GLOB SERPL: 1.5 {RATIO} (ref 1.2–2.2)
ALP SERPL-CCNC: 57 IU/L (ref 39–117)
ALT SERPL-CCNC: 12 IU/L (ref 0–32)
AST SERPL-CCNC: 16 IU/L (ref 0–40)
BASOPHILS # BLD AUTO: 0 X10E3/UL (ref 0–0.2)
BASOPHILS NFR BLD AUTO: 0 %
BILIRUB SERPL-MCNC: 0.4 MG/DL (ref 0–1.2)
BUN SERPL-MCNC: 10 MG/DL (ref 6–20)
BUN/CREAT SERPL: 14 (ref 9–23)
CALCIUM SERPL-MCNC: 9.2 MG/DL (ref 8.7–10.2)
CHLORIDE SERPL-SCNC: 106 MMOL/L (ref 96–106)
CHOLEST SERPL-MCNC: 172 MG/DL (ref 100–199)
CO2 SERPL-SCNC: 20 MMOL/L (ref 20–29)
CREAT SERPL-MCNC: 0.7 MG/DL (ref 0.57–1)
EOSINOPHIL # BLD AUTO: 0.1 X10E3/UL (ref 0–0.4)
EOSINOPHIL NFR BLD AUTO: 2 %
ERYTHROCYTE [DISTWIDTH] IN BLOOD BY AUTOMATED COUNT: 13.7 % (ref 12.3–15.4)
GLOBULIN SER CALC-MCNC: 3 G/DL (ref 1.5–4.5)
GLUCOSE SERPL-MCNC: 127 MG/DL (ref 65–99)
HCT VFR BLD AUTO: 35.1 % (ref 34–46.6)
HDLC SERPL-MCNC: 45 MG/DL
HGB BLD-MCNC: 11.5 G/DL (ref 11.1–15.9)
IMM GRANULOCYTES # BLD: 0 X10E3/UL (ref 0–0.1)
IMM GRANULOCYTES NFR BLD: 0 %
LDLC SERPL CALC-MCNC: 109 MG/DL (ref 0–99)
LYMPHOCYTES # BLD AUTO: 2.7 X10E3/UL (ref 0.7–3.1)
LYMPHOCYTES NFR BLD AUTO: 38 %
MCH RBC QN AUTO: 29.9 PG (ref 26.6–33)
MCHC RBC AUTO-ENTMCNC: 32.8 G/DL (ref 31.5–35.7)
MCV RBC AUTO: 91 FL (ref 79–97)
MONOCYTES # BLD AUTO: 0.5 X10E3/UL (ref 0.1–0.9)
MONOCYTES NFR BLD AUTO: 7 %
NEUTROPHILS # BLD AUTO: 3.7 X10E3/UL (ref 1.4–7)
NEUTROPHILS NFR BLD AUTO: 53 %
PLATELET # BLD AUTO: 303 X10E3/UL (ref 150–379)
POTASSIUM SERPL-SCNC: 4.3 MMOL/L (ref 3.5–5.2)
PROT SERPL-MCNC: 7.5 G/DL (ref 6–8.5)
RBC # BLD AUTO: 3.85 X10E6/UL (ref 3.77–5.28)
SODIUM SERPL-SCNC: 141 MMOL/L (ref 134–144)
T4 FREE SERPL-MCNC: 1.29 NG/DL (ref 0.82–1.77)
TRIGL SERPL-MCNC: 91 MG/DL (ref 0–149)
TSH SERPL DL<=0.005 MIU/L-ACNC: 0.6 UIU/ML (ref 0.45–4.5)
VLDLC SERPL CALC-MCNC: 18 MG/DL (ref 5–40)
WBC # BLD AUTO: 7.1 X10E3/UL (ref 3.4–10.8)

## 2018-12-24 NOTE — PROGRESS NOTES
Please notify patient:    CBC, kidney, liver, and thyroid level are normal.     Total cholesterol and LDL (bad cholesterol) are much better than last time. LDL remains very slightly elevated, but better. Keep up the good work! Continue to work on diet and exercise. Decrease fried, fatty foods. Bake, broil, grill, or steam foods instead of frying them. Eat fish, skinless poultry, limit high-fat meats. Try to eat two servings of fish a week (such as salmon). Increase fruits, vegetables, fiber, whole-grains. Limit alcohol intake. Try to engage in daily physical activity, 150 minutes per week of exercise is recommended.

## 2019-02-05 DIAGNOSIS — E11.9 TYPE 2 DIABETES MELLITUS WITHOUT COMPLICATION, WITHOUT LONG-TERM CURRENT USE OF INSULIN (HCC): ICD-10-CM

## 2019-02-05 RX ORDER — INSULIN PUMP SYRINGE, 3 ML
EACH MISCELLANEOUS
Qty: 1 KIT | Refills: 0 | Status: SHIPPED | OUTPATIENT
Start: 2019-02-05 | End: 2022-09-06 | Stop reason: SDUPTHER

## 2019-02-27 ENCOUNTER — TELEPHONE (OUTPATIENT)
Dept: DIABETES SERVICES | Age: 39
End: 2019-02-27

## 2019-02-27 NOTE — TELEPHONE ENCOUNTER
Pt did not show for her DM classes for second time. Called her and  answered and reported they still did nto have insurance figured out but this has been the issues since January. Spoke with patient and she does not wish to reschedule at this time and asked me to cancel last night's appt which was done.

## 2019-06-28 ENCOUNTER — HOSPITAL ENCOUNTER (EMERGENCY)
Age: 39
Discharge: LEFT BEFORE DISCHARGE | End: 2019-06-28
Attending: EMERGENCY MEDICINE
Payer: COMMERCIAL

## 2019-06-28 VITALS — HEART RATE: 88 BPM | OXYGEN SATURATION: 94 %

## 2019-06-28 PROCEDURE — 75810000275 HC EMERGENCY DEPT VISIT NO LEVEL OF CARE

## 2019-06-29 NOTE — ED NOTES
Patient left without being seen prior to triage. Patient was called several time for vital signs and was not found in waiting room.

## 2019-07-01 ENCOUNTER — OFFICE VISIT (OUTPATIENT)
Dept: PRIMARY CARE CLINIC | Age: 39
End: 2019-07-01

## 2019-07-01 ENCOUNTER — HOSPITAL ENCOUNTER (OUTPATIENT)
Dept: GENERAL RADIOLOGY | Age: 39
Discharge: HOME | End: 2019-07-01
Payer: COMMERCIAL

## 2019-07-01 VITALS
SYSTOLIC BLOOD PRESSURE: 121 MMHG | DIASTOLIC BLOOD PRESSURE: 70 MMHG | HEART RATE: 70 BPM | OXYGEN SATURATION: 100 % | RESPIRATION RATE: 18 BRPM | WEIGHT: 174.2 LBS | BODY MASS INDEX: 32.06 KG/M2 | TEMPERATURE: 98.6 F | HEIGHT: 62 IN
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
## 2019-07-01 DIAGNOSIS — M25.552 ACUTE PAIN OF LEFT HIP: ICD-10-CM

## 2019-07-01 DIAGNOSIS — M54.50 ACUTE BILATERAL LOW BACK PAIN WITHOUT SCIATICA: ICD-10-CM

## 2019-07-01 DIAGNOSIS — M25.552 ACUTE PAIN OF LEFT HIP: Primary | ICD-10-CM

## 2019-07-01 PROCEDURE — 73502 X-RAY EXAM HIP UNI 2-3 VIEWS: CPT

## 2019-07-01 RX ORDER — CYCLOBENZAPRINE HCL 10 MG
TABLET ORAL
Refills: 0 | COMMUNITY
Start: 2019-06-29 | End: 2020-06-08

## 2019-07-01 RX ORDER — METHYLPREDNISOLONE 4 MG/1
TABLET ORAL
Qty: 1 DOSE PACK | Refills: 0 | Status: SHIPPED | OUTPATIENT
Start: 2019-07-01 | End: 2020-06-08

## 2019-07-01 RX ORDER — IBUPROFEN 800 MG/1
TABLET ORAL
Refills: 0 | COMMUNITY
Start: 2019-06-29 | End: 2022-03-10 | Stop reason: ALTCHOICE

## 2019-07-01 NOTE — PROGRESS NOTES
Attempted to reach patient to discuss results, no answer, LVM to return call. Results also sent through [address] Left hip xray is normal, no fracture or acute abnormality.
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
## 2020-05-26 DIAGNOSIS — E11.9 TYPE 2 DIABETES MELLITUS WITHOUT COMPLICATION, WITHOUT LONG-TERM CURRENT USE OF INSULIN (HCC): ICD-10-CM

## 2020-05-27 RX ORDER — METFORMIN HYDROCHLORIDE 500 MG/1
TABLET, EXTENDED RELEASE ORAL
Qty: 30 TAB | Refills: 0 | Status: SHIPPED | OUTPATIENT
Start: 2020-05-27 | End: 2020-06-11

## 2020-05-27 NOTE — TELEPHONE ENCOUNTER
Message has been conveyed to patient per Dr. Donald Durand.  Patient to call to schedule for virtual visit for BP follow up

## 2020-05-27 NOTE — TELEPHONE ENCOUNTER
Please call patient to make appointment for diabetes check up. Last seen in 12/2018. Have blood drawn 2-3 days prior to VV appointment.

## 2020-06-08 ENCOUNTER — VIRTUAL VISIT (OUTPATIENT)
Dept: PRIMARY CARE CLINIC | Age: 40
End: 2020-06-08

## 2020-06-08 DIAGNOSIS — E11.9 TYPE 2 DIABETES MELLITUS WITHOUT COMPLICATION, WITHOUT LONG-TERM CURRENT USE OF INSULIN (HCC): Primary | ICD-10-CM

## 2020-06-08 DIAGNOSIS — E78.5 HYPERLIPIDEMIA LDL GOAL <70: ICD-10-CM

## 2020-06-08 NOTE — PROGRESS NOTES
Destinee Mas is a 35 y.o. female who was seen by synchronous (real-time) audio-video technology on 6/8/2020. Consent: Destinee Mas, who was seen by synchronous (real-time) audio-video technology, and/or her healthcare decision maker, is aware that this patient-initiated, Telehealth encounter on 6/8/2020 is a billable service, with coverage as determined by her insurance carrier. She is aware that she may receive a bill and has provided verbal consent to proceed: Yes. I was in the office while conducting this encounter. This virtual visit was conducted via [employer]. Pursuant to the emergency declaration under the [facility], [facility] waiver authority and the [employer] Act, this Virtual  Visit was conducted to reduce the patient's risk of exposure to COVID-19 and provide continuity of care for an established patient. Services were provided through a video synchronous discussion virtually to substitute for in-person clinic visit. Due to this being a TeleHealth evaluation, many elements of the physical examination are unable to be assessed. Assessment & Plan:     Diagnoses and all orders for this visit:    1. Type 2 diabetes mellitus without complication, without long-term current use of insulin (HCC)  -     HEMOGLOBIN A1C WITH EAG  -     METABOLIC PANEL, COMPREHENSIVE  -     LIPID PANEL  -     MICROALBUMIN, UR, RAND W/ MICROALB/CREAT RATIO         Will notify result and recommendations. 2. Hyperlipidemia LDL goal <34  -     METABOLIC PANEL, COMPREHENSIVE  -     LIPID PANEL               Not on any medication currently. Counseled on diet and exercise. Follow-up and Dispositions    · Return in about 3 months (around 9/8/2020), or if symptoms worsen or fail to improve, for DM, complete physical  and fasting blood work. .             Subjective:   Destinee Mas is a 35 y.o. female who was seen for Blood sugar problem (she is here after two years for follow up on DM. restarted Metformin a week ago. )    This is a 36 y/o F is here after two years for follow up on diabetes. She was started on metformin on 11/2018 with a a1c of 7.9. Reports that she took the med only for few months and then stopped. Reports that her sugar level was in normal range after lifestyle modification. For the past one week she started feeling numbness in her right face so she checked her sugar. Fasting sugar has been running 160s, 170s so she started taking metformin again. Denies any chest pain, soa, cough, abdominal pain. Lab Results   Component Value Date/Time    Cholesterol, total 172 12/21/2018 09:31 AM    HDL Cholesterol 45 12/21/2018 09:31 AM    LDL, calculated 109 (H) 12/21/2018 09:31 AM    VLDL, calculated 18 12/21/2018 09:31 AM    Triglyceride 91 12/21/2018 09:31 AM    CHOL/HDL Ratio 3.0 06/07/2016 04:48 AM         Prior to Admission medications    Medication Sig Start Date End Date Taking? Authorizing Provider   metFORMIN ER (GLUCOPHAGE XR) 500 mg tablet TAKE 1 TABLET BY MOUTH THREE TIMES DAILY WITH MEALS 5/27/20   Olvin Cm MD   ibuprofen (MOTRIN) 800 mg tablet TK 1 T PO Q 8 H PRN 6/29/19   Provider, Historical   cyclobenzaprine (FLEXERIL) 10 mg tablet TK 1 T PO Q 8 H PRF MUSCLE SPASMS/PAIN 6/29/19   Provider, Historical   methylPREDNISolone (MEDROL DOSEPACK) 4 mg tablet Follow package instructions. 1/7/05   Jess Roberson NP   Blood-Glucose Meter (ONETOUCH ULTRA2) monitoring kit USE TO CHECK FASTING MORNING BLOOD SUGAR ONCE DAILY 2/5/19   Olvin Cm MD   fluticasone (FLONASE) 50 mcg/actuation nasal spray 2 Sprays by Both Nostrils route daily as needed.  12/21/18   Olvin Cm MD   LO LOESTRIN FE 1 mg-10 mcg (24)/10 mcg (2) tab TK 1 T PO QD 9/16/18   Provider, Historical   trihexyphenidyl (ARTANE) 0.4 mg/mL elix TK 5 ML PO BID 10/26/18   Provider, Historical   lansoprazole (PREVACID) 30 mg capsule Take  by mouth Daily (before breakfast). Provider, Historical   glucose blood VI test strips (ONETOUCH ULTRA TEST) strip Check fasting morning blood sugar once/day 11/21/18   Olvin Cm MD Lancets misc Check fasting sugar in the morning one time/day. 11/21/18   Olvin Cm MD   ALPRAZolam (XANAX) 0.25 mg tablet Take 1 Tab by mouth three (3) times daily as needed. Max Daily Amount: 0.75 mg. 9/24/18   MD Paresh Lau [facility] ULTRASOFT LANCETS) [facility] Check fasting morning blood sugar once/day. 10/3/16   Olvin Cm MD   ondansetron (ZOFRAN ODT) 8 mg disintegrating tablet Take 1 Tab by mouth every twelve (12) hours as needed for Nausea. 8/29/16   Olvin Cm MD   butalbital-aspirin-caffeine (FIORINAL) capsule Take 1 Cap by mouth every six (6) hours as needed for Headache. Max Daily Amount: 4 Caps. 6/7/16   Selin Hussein NP   aspirin-acetaminophen-caffeine (EXCEDRIN ES) 807-924-73 mg per tablet Take 1 Tab by mouth every six (6) hours as needed for Headache. Provider, Historical     Allergies   Allergen Reactions    Nubain [Nalbuphine] Swelling     Of throat and tongue    Phenergan [Promethazine] Swelling     Caused swelling tongue and throat    Stadol [Butorphanol Tartrate] Swelling     Of tongue and throat       Patient Active Problem List   Diagnosis Code    Dyspnea R06.00    Anxiety F41.9    Weight gain R63.5    Elbow pain M25.529    Dyslipidemia E78.5    TIA (transient ischemic attack) G45.9    Precordial pain R07.2    Hyperlipidemia LDL goal <70 E78.5    Severe obesity (BMI 35.0-39. 9) E66.01    Type 2 diabetes mellitus without complication, without long-term current use of insulin (HCC) E11.9    Gastroesophageal reflux disease without esophagitis K21.9     Current Outpatient Medications   Medication Sig Dispense Refill    metFORMIN ER (GLUCOPHAGE XR) 500 mg tablet TAKE 1 TABLET BY MOUTH THREE TIMES DAILY WITH MEALS 30 Tab 0    ibuprofen (MOTRIN) 800 mg tablet TK 1 T PO Q 8 H PRN  0    cyclobenzaprine (FLEXERIL) 10 mg tablet TK 1 T PO Q 8 H PRF MUSCLE SPASMS/PAIN  0    methylPREDNISolone (MEDROL DOSEPACK) 4 mg tablet Follow package instructions. 1 Dose Pack 0    Blood-Glucose Meter (ONETOUCH ULTRA2) monitoring kit USE TO CHECK FASTING MORNING BLOOD SUGAR ONCE DAILY 1 Kit 0    fluticasone (FLONASE) 50 mcg/actuation nasal spray 2 Sprays by Both Nostrils route daily as needed. 1 Bottle 1    LO LOESTRIN FE 1 mg-10 mcg (24)/10 mcg (2) tab TK 1 T PO QD  4    trihexyphenidyl (ARTANE) 0.4 mg/mL elix TK 5 ML PO BID  11    lansoprazole (PREVACID) 30 mg capsule Take  by mouth Daily (before breakfast).  glucose blood VI test strips (ONETOUCH ULTRA TEST) strip Check fasting morning blood sugar once/day 60 Strip 11    Lancets misc Check fasting sugar in the morning one time/day. 1 Each 11    ALPRAZolam (XANAX) 0.25 mg tablet Take 1 Tab by mouth three (3) times daily as needed. Max Daily Amount: 0.75 mg. 40 Tab 0    Lancets (ONETOUCH ULTRASOFT LANCETS) misc Check fasting morning blood sugar once/day. 1 Each 11    ondansetron (ZOFRAN ODT) 8 mg disintegrating tablet Take 1 Tab by mouth every twelve (12) hours as needed for Nausea. 10 Tab 0    butalbital-aspirin-caffeine (FIORINAL) capsule Take 1 Cap by mouth every six (6) hours as needed for Headache. Max Daily Amount: 4 Caps. 20 Cap 0    aspirin-acetaminophen-caffeine (EXCEDRIN ES) 250-250-65 mg per tablet Take 1 Tab by mouth every six (6) hours as needed for Headache. Allergies   Allergen Reactions    Nubain [Nalbuphine] Swelling     Of throat and tongue    Phenergan [Promethazine] Swelling     Caused swelling tongue and throat    Stadol [Butorphanol Tartrate] Swelling     Of tongue and throat     Past Medical History:   Diagnosis Date    Diabetes ([facility] [address]     Hyperlipidemia     TIA (transient ischemic attack) 6/7/2016       ROS  Refer to HPI.   Objective:   Vital Signs: (As obtained by patient/caregiver at home)  There were no vitals taken for this visit. [INSTRUCTIONS:  \"[x]\" Indicates a positive item  \"[]\" Indicates a negative item  -- DELETE ALL ITEMS NOT EXAMINED]    Constitutional: [x] Appears well-developed and well-nourished [x] No apparent distress      [] Abnormal -     Mental status: [x] Alert and awake  [x] Oriented to person/place/time [x] Able to follow commands    [] Abnormal -     Eyes:   EOM    [x]  Normal    [] Abnormal -   Sclera  [x]  Normal    [] Abnormal -          Discharge [x]  None visible   [] Abnormal -     HENT: [x] Normocephalic, atraumatic  [] Abnormal -   [x] Mouth/Throat: Mucous membranes are moist    External Ears [x] Normal  [] Abnormal -    Neck: [x] No visualized mass [] Abnormal -     Pulmonary/Chest: [x] Respiratory effort normal   [x] No visualized signs of difficulty breathing or respiratory distress        [] Abnormal -      Musculoskeletal:   [] Normal gait with no signs of ataxia         [x] Normal range of motion of neck        [] Abnormal -     Neurological:        [x] No Facial Asymmetry (Cranial nerve 7 motor function) (limited exam due to video visit)          [x] No gaze palsy        [] Abnormal -          Skin:        [x] No significant exanthematous lesions or discoloration noted on facial skin         [] Abnormal -            Psychiatric:       [x] Normal Affect [] Abnormal -        [] No Hallucinations    Other pertinent observable physical exam findings:-        We discussed the expected course, resolution and complications of the diagnosis(es) in detail. Medication risks, benefits, costs, interactions, and alternatives were discussed as indicated. I advised her to contact the office if her condition worsens, changes or fails to improve as anticipated. She expressed understanding with the diagnosis(es) and plan. Sony Pickens is a 35 y.o. female who was evaluated by a video visit encounter for concerns as above. Patient identification was verified prior to start of the visit. A caregiver was present when appropriate. Due to this being a TeleHealth encounter (During GHY-58 public health emergency), evaluation of the following organ systems was limited: Vitals/Constitutional/EENT/Resp/CV/GI//MS/Neuro/Skin/Heme-Lymph-Imm. Pursuant to the emergency declaration under the [address] [place] [address], [facility] waiver authority and the [employer] Act, this Virtual  Visit was conducted, with patient's (and/or legal guardian's) consent, to reduce the patient's risk of exposure to COVID-19 and provide necessary medical care. Services were provided through a video synchronous discussion virtually to substitute for in-person clinic visit.        Denisha Elizabeth MD

## 2020-06-11 ENCOUNTER — TELEPHONE (OUTPATIENT)
Dept: PRIMARY CARE CLINIC | Age: 40
End: 2020-06-11

## 2020-06-11 DIAGNOSIS — E11.9 TYPE 2 DIABETES MELLITUS WITHOUT COMPLICATION, WITHOUT LONG-TERM CURRENT USE OF INSULIN (HCC): ICD-10-CM

## 2020-06-11 LAB
ALBUMIN SERPL-MCNC: 4.6 G/DL (ref 3.8–4.8)
ALBUMIN/CREAT UR: 4 MG/G CREAT (ref 0–29)
ALBUMIN/GLOB SERPL: 1.8 {RATIO} (ref 1.2–2.2)
ALP SERPL-CCNC: 56 IU/L (ref 39–117)
ALT SERPL-CCNC: 14 IU/L (ref 0–32)
AST SERPL-CCNC: 16 IU/L (ref 0–40)
BILIRUB SERPL-MCNC: 0.5 MG/DL (ref 0–1.2)
BUN SERPL-MCNC: 12 MG/DL (ref 6–24)
BUN/CREAT SERPL: 16 (ref 9–23)
CALCIUM SERPL-MCNC: 9.2 MG/DL (ref 8.7–10.2)
CHLORIDE SERPL-SCNC: 102 MMOL/L (ref 96–106)
CHOLEST SERPL-MCNC: 154 MG/DL (ref 100–199)
CO2 SERPL-SCNC: 24 MMOL/L (ref 20–29)
CREAT SERPL-MCNC: 0.74 MG/DL (ref 0.57–1)
CREAT UR-MCNC: 203.1 MG/DL
EST. AVERAGE GLUCOSE BLD GHB EST-MCNC: 148 MG/DL
GLOBULIN SER CALC-MCNC: 2.5 G/DL (ref 1.5–4.5)
GLUCOSE SERPL-MCNC: 142 MG/DL (ref 65–99)
HBA1C MFR BLD: 6.8 % (ref 4.8–5.6)
HDLC SERPL-MCNC: 52 MG/DL
LDLC SERPL CALC-MCNC: 89 MG/DL (ref 0–99)
MICROALBUMIN UR-MCNC: 7.9 UG/ML
POTASSIUM SERPL-SCNC: 4 MMOL/L (ref 3.5–5.2)
PROT SERPL-MCNC: 7.1 G/DL (ref 6–8.5)
SODIUM SERPL-SCNC: 136 MMOL/L (ref 134–144)
TRIGL SERPL-MCNC: 67 MG/DL (ref 0–149)
VLDLC SERPL CALC-MCNC: 13 MG/DL (ref 5–40)

## 2020-06-11 RX ORDER — METFORMIN HYDROCHLORIDE 500 MG/1
500 TABLET, EXTENDED RELEASE ORAL 2 TIMES DAILY WITH MEALS
Qty: 60 TAB | Refills: 3 | Status: SHIPPED | OUTPATIENT
Start: 2020-06-11 | End: 2021-10-18 | Stop reason: SINTOL

## 2020-06-11 NOTE — PROGRESS NOTES
Please call and mail result to patient;    1. A1c ( average blood sugar) is 6.8. Take metformin 500 mg BID instead of TID. New prescription sent to the pharmacy. 2. Kidney and liver function is normal.     3. Cholesterol level is better than last time. LDL level is slightly above the goal but its better. Continue work on diet and exercise. Will have a follow up in 3 months. 3. Urine is negative for albumin. Great!

## 2020-06-11 NOTE — TELEPHONE ENCOUNTER
----- Message from Victorino Chan MD sent at 6/11/2020  2:20 PM EDT -----  Please call and mail result to patient;    1. A1c ( average blood sugar) is 6.8. Take metformin 500 mg BID instead of TID. New prescription sent to the pharmacy. 2. Kidney and liver function is normal.     3. Cholesterol level is better than last time. LDL level is slightly above the goal but its better. Continue work on diet and exercise. Will have a follow up in 3 months. 3. Urine is negative for albumin. Great!

## 2020-06-11 NOTE — TELEPHONE ENCOUNTER
Patient called into the office to see why she got a message from the pharmacy about a medication being refilled. Discussed results and recommendations for patient and explained to her to take metformin BID not TID. She verbalized her understanding and thanked me.

## 2020-09-24 ENCOUNTER — TELEPHONE (OUTPATIENT)
Dept: PRIMARY CARE CLINIC | Age: 40
End: 2020-09-24

## 2020-09-24 NOTE — TELEPHONE ENCOUNTER
Returned call to patient's . Advised him to contact his insurance company to find out who is in network then call that office and schedule the appointment.  At that time notify this office and the referral will be done for that doctor

## 2020-09-24 NOTE — TELEPHONE ENCOUNTER
Pts.  called to request a Referral   To Psychiatrist specializing in depression and anxiety for his wife. He stated that her symptoms have gotten severely worse and she needs help. Asked Pt.   If he had a facility or DrJailene In mind, Cornel funez and he is not worried about them being in network. Pt.  stated he would just like a referral to anyone that Dr. Joseluis Carvalho picks that specializes in Depression/ psychiatry- as soon as possible.      Pt.  requested a call to know when referral was done and is okay to be called if there are any questions- [phone]

## 2020-10-01 ENCOUNTER — VIRTUAL VISIT (OUTPATIENT)
Dept: PRIMARY CARE CLINIC | Age: 40
End: 2020-10-01
Payer: COMMERCIAL

## 2020-10-01 DIAGNOSIS — Z63.4 DEATH OF FAMILY MEMBER: ICD-10-CM

## 2020-10-01 DIAGNOSIS — F41.0 PANIC ATTACKS: Primary | ICD-10-CM

## 2020-10-01 PROCEDURE — 99214 OFFICE O/P EST MOD 30 MIN: CPT | Performed by: FAMILY MEDICINE

## 2020-10-01 RX ORDER — CLONAZEPAM 0.5 MG/1
0.5 TABLET ORAL
Qty: 15 TAB | Refills: 0 | Status: SHIPPED | OUTPATIENT
Start: 2020-10-01 | End: 2021-08-18 | Stop reason: ALTCHOICE

## 2020-10-01 SDOH — SOCIAL STABILITY - SOCIAL INSECURITY: DISSAPEARANCE AND DEATH OF FAMILY MEMBER: Z63.4

## 2020-10-01 NOTE — LETTER
NOTIFICATION RETURN TO WORK / SCHOOL 
 
10/1/2020 1:18 PM 
 
Ms. Burke Whitehead [address] [phone] To Whom It May Concern: 
 
Donavon Espino is currently under the care of Evelin Reynolds. She will return to work/school on 10/8/2020 If there are questions or concerns please have the patient contact our office.  
 
 
 
Sincerely, 
 
 
Linda Soni MD

## 2020-10-01 NOTE — PROGRESS NOTES
Agata Beyer is a 35 y.o. female who was seen by synchronous (real-time) audio-video technology on 10/1/2020 for No chief complaint on file. Assessment & Plan:     Diagnoses and all orders for this visit:    1. Panic attacks  -     clonazePAM (KlonoPIN) 0.5 mg tablet; Take 1 Tab by mouth two (2) times daily as needed for Anxiety. Max Daily Amount: 1 mg.   work off provided until 10/8. provided the phone contact numbers for counselor. Counseling provided. 2. Death of family member  -     clonazePAM (KlonoPIN) 0.5 mg tablet; Take 1 Tab by mouth two (2) times daily as needed for Anxiety. Max Daily Amount: 1 mg. Same as #1    Follow-up and Dispositions    · Return if symptoms worsen or fail to improve. Subjective: This is a 34 y/o F is here with a complain of panic attacks. Her  was there during the visit. She reports that her 46 y/o brother just passed away on 9/20/2020 and since then she has been having trouble sleeping at night. Having frequent panic attacks. She was not able to go to work since last Thursday and she does not think she is ready to go back to work yet. Reports that she tried to make appointment with therapist but no luck yet. Prior to Admission medications    Medication Sig Start Date End Date Taking? Authorizing Provider   metFORMIN ER (GLUCOPHAGE XR) 500 mg tablet Take 1 Tab by mouth two (2) times daily (with meals). 6/11/20   Olvin Cm MD   ibuprofen (MOTRIN) 800 mg tablet TK 1 T PO Q 8 H PRN 6/29/19   Provider, Historical   Blood-Glucose Meter (ONETOUCH ULTRA2) monitoring kit USE TO CHECK FASTING MORNING BLOOD SUGAR ONCE DAILY 2/5/19   Olvin Cm MD   fluticasone (FLONASE) 50 mcg/actuation nasal spray 2 Sprays by Both Nostrils route daily as needed.  12/21/18   Olvin Cm MD   LO LOESTRIN FE 1 mg-10 mcg (24)/10 mcg (2) tab TK 1 T PO QD 9/16/18   Provider, Historical   trihexyphenidyl (ARTANE) 0.4 mg/mL elix TK 5 ML PO BID 10/26/18   Provider, Historical   lansoprazole (PREVACID) 30 mg capsule Take  by mouth Daily (before breakfast). Provider, Historical   glucose blood VI test strips (ONETOUCH ULTRA TEST) strip Check fasting morning blood sugar once/day 11/21/18   Olvin Cm MD   Lancets misc Check fasting sugar in the morning one time/day. 11/21/18   Olvin Cm MD   ALPRAZolam (XANAX) 0.25 mg tablet Take 1 Tab by mouth three (3) times daily as needed. Max Daily Amount: 0.75 mg. 9/24/18   Rodríguez Bell MD   Lancets [facility] ULTRASOFT LANCETS) [facility] Check fasting morning blood sugar once/day. 10/3/16   Olvin Cm MD   butalbital-aspirin-caffeine (FIORINAL) capsule Take 1 Cap by mouth every six (6) hours as needed for Headache. Max Daily Amount: 4 Caps. 6/7/16   Jessica Hooks NP   aspirin-acetaminophen-caffeine (EXCEDRIN ES) 607-180-17 mg per tablet Take 1 Tab by mouth every six (6) hours as needed for Headache. Provider, Historical     Patient Active Problem List   Diagnosis Code    Dyspnea R06.00    Anxiety F41.9    Weight gain R63.5    Elbow pain M25.529    Dyslipidemia E78.5    TIA (transient ischemic attack) G45.9    Precordial pain R07.2    Hyperlipidemia LDL goal <70 E78.5    Severe obesity (BMI 35.0-39. 9) E66.01    Type 2 diabetes mellitus without complication, without long-term current use of insulin (HCC) E11.9    Gastroesophageal reflux disease without esophagitis K21.9     Current Outpatient Medications   Medication Sig Dispense Refill    clonazePAM (KlonoPIN) 0.5 mg tablet Take 1 Tab by mouth two (2) times daily as needed for Anxiety. Max Daily Amount: 1 mg. 15 Tab 0    metFORMIN ER (GLUCOPHAGE XR) 500 mg tablet Take 1 Tab by mouth two (2) times daily (with meals).  60 Tab 3    LO LOESTRIN FE 1 mg-10 mcg (24)/10 mcg (2) tab TK 1 T PO QD  4    trihexyphenidyl (ARTANE) 0.4 mg/mL elix TK 5 ML PO BID  11    ibuprofen (MOTRIN) 800 mg tablet TK 1 T PO Q 8 H PRN  0    Blood-Glucose Meter (ONETOUCH ULTRA2) monitoring kit USE TO CHECK FASTING MORNING BLOOD SUGAR ONCE DAILY 1 Kit 0    fluticasone (FLONASE) 50 mcg/actuation nasal spray 2 Sprays by Both Nostrils route daily as needed. 1 Bottle 1    lansoprazole (PREVACID) 30 mg capsule Take  by mouth Daily (before breakfast).  glucose blood VI test strips (ONETOUCH ULTRA TEST) strip Check fasting morning blood sugar once/day 60 Strip 11    Lancets misc Check fasting sugar in the morning one time/day. 1 Each 11    Lancets (ONETOUCH ULTRASOFT LANCETS) mis Check fasting morning blood sugar once/day. 1 Each 11    butalbital-aspirin-caffeine (FIORINAL) capsule Take 1 Cap by mouth every six (6) hours as needed for Headache. Max Daily Amount: 4 Caps. 20 Cap 0    aspirin-acetaminophen-caffeine (EXCEDRIN ES) 250-250-65 mg per tablet Take 1 Tab by mouth every six (6) hours as needed for Headache. Allergies   Allergen Reactions    Nubain [Nalbuphine] Swelling     Of throat and tongue    Phenergan [Promethazine] Swelling     Caused swelling tongue and throat    Stadol [Butorphanol Tartrate] Swelling     Of tongue and throat     Past Medical History:   Diagnosis Date    Diabetes ([facility] [address]     Hyperlipidemia     TIA (transient ischemic attack) 6/7/2016       ROS    Refer to HPI. Objective:   No flowsheet data found. [INSTRUCTIONS:  \"[x]\" Indicates a positive item  \"[]\" Indicates a negative item  -- DELETE ALL ITEMS NOT EXAMINED]    Constitutional: [x] Appears well-developed and well-nourished [x] No apparent distress      [] Abnormal - Looks sad, lack of eye contact.      Mental status: [x] Alert and awake  [x] Oriented to person/place/time [x] Able to follow commands    [] Abnormal -     Eyes:   EOM    [x]  Normal    [] Abnormal -   Sclera  [x]  Normal    [] Abnormal -          Discharge [x]  None visible   [] Abnormal -     HENT: [x] Normocephalic, atraumatic  [] Abnormal -   [x] Mouth/Throat: Mucous membranes are moist    External Ears [x] Normal  [] Abnormal -    Neck: [x] No visualized mass [] Abnormal -     Pulmonary/Chest: [x] Respiratory effort normal   [x] No visualized signs of difficulty breathing or respiratory distress        [] Abnormal -      Musculoskeletal:   [x] Normal gait with no signs of ataxia         [x] Normal range of motion of neck        [] Abnormal -     Neurological:        [x] No Facial Asymmetry (Cranial nerve 7 motor function) (limited exam due to video visit)          [x] No gaze palsy        [] Abnormal -          Skin:        [x] No significant exanthematous lesions or discoloration noted on facial skin         [] Abnormal -            Psychiatric:       [] Normal Affect [] Abnormal - looks sad. [] No Hallucinations    Other pertinent observable physical exam findings:-        We discussed the expected course, resolution and complications of the diagnosis(es) in detail. Medication risks, benefits, costs, interactions, and alternatives were discussed as indicated. I advised her to contact the office if her condition worsens, changes or fails to improve as anticipated. She expressed understanding with the diagnosis(es) and plan. Halie Wallace, who was evaluated through a patient-initiated, synchronous (real-time) audio-video encounter, and/or her healthcare decision maker, is aware that it is a billable service, with coverage as determined by her insurance carrier. She provided verbal consent to proceed: Yes, and patient identification was verified. It was conducted pursuant to the emergency declaration under the [facility], [address] [place] [address] authority and the [employer] Act. A caregiver was present when appropriate. Ability to conduct physical exam was limited. I was in the office. The patient was at home.       Zoë Gonzales MD

## 2020-10-14 ENCOUNTER — VIRTUAL VISIT (OUTPATIENT)
Dept: PRIMARY CARE CLINIC | Age: 40
End: 2020-10-14
Payer: COMMERCIAL

## 2020-10-14 DIAGNOSIS — F41.9 ANXIETY: ICD-10-CM

## 2020-10-14 DIAGNOSIS — F41.0 PANIC ATTACKS: ICD-10-CM

## 2020-10-14 DIAGNOSIS — Z63.4 DEATH OF FAMILY MEMBER: Primary | ICD-10-CM

## 2020-10-14 DIAGNOSIS — Z02.89 ENCOUNTER FOR COMPLETION OF FORM WITH PATIENT: ICD-10-CM

## 2020-10-14 PROCEDURE — 99215 OFFICE O/P EST HI 40 MIN: CPT | Performed by: FAMILY MEDICINE

## 2020-10-14 RX ORDER — ESCITALOPRAM OXALATE 5 MG/1
TABLET ORAL
COMMUNITY
Start: 2020-10-13 | End: 2021-07-12

## 2020-10-14 RX ORDER — PROPRANOLOL HYDROCHLORIDE 20 MG/1
TABLET ORAL
COMMUNITY
Start: 2020-10-13 | End: 2022-03-10 | Stop reason: ALTCHOICE

## 2020-10-14 SDOH — SOCIAL STABILITY - SOCIAL INSECURITY: DISSAPEARANCE AND DEATH OF FAMILY MEMBER: Z63.4

## 2020-10-16 NOTE — PROGRESS NOTES
Aylin Herron is a 35 y.o. female who was seen by synchronous (real-time) audio-video technology on 10/14/2020 for Form Completion ([facility] form completion. )        Assessment & Plan:     Diagnoses and all orders for this visit:    1. Death of family member       Counseling provided. Patient has established with a psychiatrist and started on treatment. Will continue to follow up with psychiatrist.   2. Panic attacks      Same as #1  3. Anxiety      Same as #1  4. Encounter for completion of form with patient      Short term disability form completed. Follow-up and Dispositions    · Return in about 1 week (around 10/21/2020), or if symptoms worsen or fail to improve, for diabetes. Ronold Kanner Spent > 40 minutes coordinating care, filling two different forms. Subjective: This is a 36 y/o F is here with a complain of panic attacks. She reports that her 44 y/o brother just passed away on 9/20/2020 and since then she has been having trouble sleeping at night. Having frequent panic attacks. She was not able to go to work since 9/21/2020. She tried to go back to work this week but had to leave work due to panic attacks, overwhelming feeling. States that her work environment/high job demand is not favorable with her anxiety. Unable to focus at work, unable to perform her best. Arnold Ashing is somewhat limited. Feels sad. Sleeping okay. She has seen a psychiatrist yesterday and started on Propranolol and Lexapro. Prior to Admission medications    Medication Sig Start Date End Date Taking? Authorizing Provider   clonazePAM (KlonoPIN) 0.5 mg tablet Take 1 Tab by mouth two (2) times daily as needed for Anxiety. Max Daily Amount: 1 mg. 10/1/20  Yes Radha Mohamud MD   metFORMIN ER (GLUCOPHAGE XR) 500 mg tablet Take 1 Tab by mouth two (2) times daily (with meals).  6/11/20  Yes Olvin Cm MD   escitalopram oxalate (LEXAPRO) 5 mg tablet  10/13/20   Provider, Historical   propranoloL (INDERAL) 20 mg tablet  10/13/20   Provider, Historical   ibuprofen (MOTRIN) 800 mg tablet TK 1 T PO Q 8 H PRN 6/29/19   Provider, Historical   Blood-Glucose Meter (ONETOUCH ULTRA2) monitoring kit USE TO CHECK FASTING MORNING BLOOD SUGAR ONCE DAILY 2/5/19   Olvin Cm MD   fluticasone (FLONASE) 50 mcg/actuation nasal spray 2 Sprays by Both Nostrils route daily as needed. 12/21/18   Olvin Cm MD   LO LOESTRIN FE 1 mg-10 mcg (24)/10 mcg (2) tab TK 1 T PO QD 9/16/18   Provider, Historical   trihexyphenidyl (ARTANE) 0.4 mg/mL elix TK 5 ML PO BID 10/26/18   Provider, Historical   lansoprazole (PREVACID) 30 mg capsule Take  by mouth Daily (before breakfast). Provider, Historical   glucose blood VI test strips (ONETOUCH ULTRA TEST) strip Check fasting morning blood sugar once/day 11/21/18   Olvin Cm MD   Lancets misc Check fasting sugar in the morning one time/day. 11/21/18   Olvin Cm MD   Lancets (ONETOUCH ULTRASOFT LANCETS) misc Check fasting morning blood sugar once/day. 10/3/16   Olvin Cm MD   butalbital-aspirin-caffeine (FIORINAL) capsule Take 1 Cap by mouth every six (6) hours as needed for Headache. Max Daily Amount: 4 Caps. 6/7/16   Mony Reynolds NP   aspirin-acetaminophen-caffeine (EXCEDRIN ES) 081-083-60 mg per tablet Take 1 Tab by mouth every six (6) hours as needed for Headache. Provider, Historical     Patient Active Problem List   Diagnosis Code    Dyspnea R06.00    Anxiety F41.9    Weight gain R63.5    Elbow pain M25.529    Dyslipidemia E78.5    TIA (transient ischemic attack) G45.9    Precordial pain R07.2    Hyperlipidemia LDL goal <70 E78.5    Severe obesity (BMI 35.0-39. 9) E66.01    Type 2 diabetes mellitus without complication, without long-term current use of insulin (HCC) E11.9    Gastroesophageal reflux disease without esophagitis K21.9     Current Outpatient Medications   Medication Sig Dispense Refill    clonazePAM (KlonoPIN) 0.5 mg tablet Take 1 Tab by mouth two (2) times daily as needed for Anxiety. Max Daily Amount: 1 mg. 15 Tab 0    metFORMIN ER (GLUCOPHAGE XR) 500 mg tablet Take 1 Tab by mouth two (2) times daily (with meals). 60 Tab 3    escitalopram oxalate (LEXAPRO) 5 mg tablet       propranoloL (INDERAL) 20 mg tablet       ibuprofen (MOTRIN) 800 mg tablet TK 1 T PO Q 8 H PRN  0    Blood-Glucose Meter (ONETOUCH ULTRA2) monitoring kit USE TO CHECK FASTING MORNING BLOOD SUGAR ONCE DAILY 1 Kit 0    fluticasone (FLONASE) 50 mcg/actuation nasal spray 2 Sprays by Both Nostrils route daily as needed. 1 Bottle 1    LO LOESTRIN FE 1 mg-10 mcg (24)/10 mcg (2) tab TK 1 T PO QD  4    trihexyphenidyl (ARTANE) 0.4 mg/mL elix TK 5 ML PO BID  11    lansoprazole (PREVACID) 30 mg capsule Take  by mouth Daily (before breakfast).  glucose blood VI test strips (ONETOUCH ULTRA TEST) strip Check fasting morning blood sugar once/day 60 Strip 11    Lancets misc Check fasting sugar in the morning one time/day. 1 Each 11    Lancets (ONETOUCH ULTRASOFT LANCETS) misc Check fasting morning blood sugar once/day. 1 Each 11    butalbital-aspirin-caffeine (FIORINAL) capsule Take 1 Cap by mouth every six (6) hours as needed for Headache. Max Daily Amount: 4 Caps. 20 Cap 0    aspirin-acetaminophen-caffeine (EXCEDRIN ES) 250-250-65 mg per tablet Take 1 Tab by mouth every six (6) hours as needed for Headache. Allergies   Allergen Reactions    Nubain [Nalbuphine] Swelling     Of throat and tongue    Phenergan [Promethazine] Swelling     Caused swelling tongue and throat    Stadol [Butorphanol Tartrate] Swelling     Of tongue and throat     Past Medical History:   Diagnosis Date    Diabetes ([facility] [address]     Hyperlipidemia     TIA (transient ischemic attack) 6/7/2016       ROS    Objective:   No flowsheet data found.      [INSTRUCTIONS:  \"[x]\" Indicates a positive item  \"[]\" Indicates a negative item  -- DELETE ALL ITEMS NOT EXAMINED]    Constitutional: [x] Appears well-developed and well-nourished [x] No apparent distress      [] Abnormal -     Mental status: [x] Alert and awake  [x] Oriented to person/place/time [x] Able to follow commands . Organized thoughts. [] Abnormal -     Eyes:   EOM    [x]  Normal    [] Abnormal -   Sclera  [x]  Normal    [] Abnormal -          Discharge [x]  None visible   [] Abnormal -     HENT: [x] Normocephalic, atraumatic  [] Abnormal -   [x] Mouth/Throat: Mucous membranes are moist    External Ears [x] Normal  [] Abnormal -    Neck: [x] No visualized mass [] Abnormal -     Pulmonary/Chest: [x] Respiratory effort normal   [x] No visualized signs of difficulty breathing or respiratory distress        [] Abnormal -      Musculoskeletal:   [x] Normal gait with no signs of ataxia         [x] Normal range of motion of neck        [] Abnormal -     Neurological:        [x] No Facial Asymmetry (Cranial nerve 7 motor function) (limited exam due to video visit)          [x] No gaze palsy        [] Abnormal -          Skin:        [x] No significant exanthematous lesions or discoloration noted on facial skin         [] Abnormal -            Psychiatric:       [] Normal Affect [] Abnormal - flat affect . [x] No Hallucinations    Other pertinent observable physical exam findings:-        We discussed the expected course, resolution and complications of the diagnosis(es) in detail. Medication risks, benefits, costs, interactions, and alternatives were discussed as indicated. I advised her to contact the office if her condition worsens, changes or fails to improve as anticipated. She expressed understanding with the diagnosis(es) and plan. Lillian Mills, who was evaluated through a patient-initiated, synchronous (real-time) audio-video encounter, and/or her healthcare decision maker, is aware that it is a billable service, with coverage as determined by her insurance carrier.  She provided verbal consent to proceed: Yes, and patient identification was verified. It was conducted pursuant to the emergency declaration under the [facility], [address] [place] [address] and the [employer] and [employer] General Act. A caregiver was present when appropriate. Ability to conduct physical exam was limited. I was in the office. The patient was at home.       Nicole Beltran MD

## 2021-07-07 ENCOUNTER — OFFICE VISIT (OUTPATIENT)
Dept: PRIMARY CARE CLINIC | Age: 41
End: 2021-07-07
Payer: COMMERCIAL

## 2021-07-07 VITALS
OXYGEN SATURATION: 99 % | SYSTOLIC BLOOD PRESSURE: 121 MMHG | BODY MASS INDEX: 35.85 KG/M2 | HEIGHT: 62 IN | TEMPERATURE: 98.4 F | WEIGHT: 194.8 LBS | DIASTOLIC BLOOD PRESSURE: 73 MMHG | HEART RATE: 89 BPM | RESPIRATION RATE: 16 BRPM

## 2021-07-07 DIAGNOSIS — E78.5 HYPERLIPIDEMIA LDL GOAL <70: ICD-10-CM

## 2021-07-07 DIAGNOSIS — Z00.00 WELL ADULT ON ROUTINE HEALTH CHECK: Primary | ICD-10-CM

## 2021-07-07 DIAGNOSIS — E11.9 TYPE 2 DIABETES MELLITUS WITHOUT COMPLICATION, WITHOUT LONG-TERM CURRENT USE OF INSULIN (HCC): ICD-10-CM

## 2021-07-07 DIAGNOSIS — R06.83 SNORING: ICD-10-CM

## 2021-07-07 LAB
ALBUMIN SERPL-MCNC: 4.1 G/DL (ref 3.5–5)
ALBUMIN/GLOB SERPL: 1.2 {RATIO} (ref 1.1–2.2)
ALP SERPL-CCNC: 68 U/L (ref 45–117)
ALT SERPL-CCNC: 26 U/L (ref 12–78)
ANION GAP SERPL CALC-SCNC: 9 MMOL/L (ref 5–15)
AST SERPL-CCNC: 16 U/L (ref 15–37)
BASOPHILS # BLD: 0.1 K/UL (ref 0–0.1)
BASOPHILS NFR BLD: 1 % (ref 0–1)
BILIRUB SERPL-MCNC: 0.6 MG/DL (ref 0.2–1)
BUN SERPL-MCNC: 9 MG/DL (ref 6–20)
BUN/CREAT SERPL: 14 (ref 12–20)
CALCIUM SERPL-MCNC: 9.4 MG/DL (ref 8.5–10.1)
CHLORIDE SERPL-SCNC: 103 MMOL/L (ref 97–108)
CHOLEST SERPL-MCNC: 204 MG/DL
CO2 SERPL-SCNC: 23 MMOL/L (ref 21–32)
CREAT SERPL-MCNC: 0.63 MG/DL (ref 0.55–1.02)
CREAT UR-MCNC: 151 MG/DL
DIFFERENTIAL METHOD BLD: ABNORMAL
EOSINOPHIL # BLD: 0.1 K/UL (ref 0–0.4)
EOSINOPHIL NFR BLD: 1 % (ref 0–7)
ERYTHROCYTE [DISTWIDTH] IN BLOOD BY AUTOMATED COUNT: 12.3 % (ref 11.5–14.5)
GLOBULIN SER CALC-MCNC: 3.4 G/DL (ref 2–4)
GLUCOSE SERPL-MCNC: 192 MG/DL (ref 65–100)
HBA1C MFR BLD HPLC: 8.7 %
HCT VFR BLD AUTO: 34.9 % (ref 35–47)
HDLC SERPL-MCNC: 60 MG/DL
HDLC SERPL: 3.4 {RATIO} (ref 0–5)
HGB BLD-MCNC: 11.7 G/DL (ref 11.5–16)
IMM GRANULOCYTES # BLD AUTO: 0.1 K/UL (ref 0–0.04)
IMM GRANULOCYTES NFR BLD AUTO: 1 % (ref 0–0.5)
LDLC SERPL CALC-MCNC: 125 MG/DL (ref 0–100)
LYMPHOCYTES # BLD: 3.4 K/UL (ref 0.8–3.5)
LYMPHOCYTES NFR BLD: 35 % (ref 12–49)
MCH RBC QN AUTO: 31.1 PG (ref 26–34)
MCHC RBC AUTO-ENTMCNC: 33.5 G/DL (ref 30–36.5)
MCV RBC AUTO: 92.8 FL (ref 80–99)
MICROALBUMIN UR-MCNC: 2.3 MG/DL
MICROALBUMIN/CREAT UR-RTO: 15 MG/G (ref 0–30)
MONOCYTES # BLD: 0.7 K/UL (ref 0–1)
MONOCYTES NFR BLD: 7 % (ref 5–13)
NEUTS SEG # BLD: 5.4 K/UL (ref 1.8–8)
NEUTS SEG NFR BLD: 55 % (ref 32–75)
NRBC # BLD: 0 K/UL (ref 0–0.01)
NRBC BLD-RTO: 0 PER 100 WBC
PLATELET # BLD AUTO: 234 K/UL (ref 150–400)
PMV BLD AUTO: 11.2 FL (ref 8.9–12.9)
POTASSIUM SERPL-SCNC: 4 MMOL/L (ref 3.5–5.1)
PROT SERPL-MCNC: 7.5 G/DL (ref 6.4–8.2)
RBC # BLD AUTO: 3.76 M/UL (ref 3.8–5.2)
SODIUM SERPL-SCNC: 135 MMOL/L (ref 136–145)
TRIGL SERPL-MCNC: 95 MG/DL (ref ?–150)
VLDLC SERPL CALC-MCNC: 19 MG/DL
WBC # BLD AUTO: 9.8 K/UL (ref 3.6–11)

## 2021-07-07 PROCEDURE — 99213 OFFICE O/P EST LOW 20 MIN: CPT | Performed by: FAMILY MEDICINE

## 2021-07-07 PROCEDURE — 83036 HEMOGLOBIN GLYCOSYLATED A1C: CPT | Performed by: FAMILY MEDICINE

## 2021-07-07 PROCEDURE — 99396 PREV VISIT EST AGE 40-64: CPT | Performed by: FAMILY MEDICINE

## 2021-07-07 PROCEDURE — 3052F HG A1C>EQUAL 8.0%<EQUAL 9.0%: CPT | Performed by: FAMILY MEDICINE

## 2021-07-07 NOTE — PROGRESS NOTES
Subjective:   38 y.o. female is here for Well Woman Check as well as follow up on diabetes. She reports that she has stopped taking Metformin for a year or so. Has been following healthy diet. FBS has been running in lower 100s range she states. Her gyne and breast care is done elsewhere by her Ob-Gyne physician. Up to date with mammogram and pap. Right eye droops when she goes to sun. Tested for myasthenia, seen about 4 neurologist but nothing was found to be the cause. Went to [facility] 3 weeks ago with hypopigmented  rash in her right upper extremity. Went to see dermatologist where she had a skin biopsy done. She reports that Biopsy result was not confirmatory. She was advised to have a follow up with rheumatologist.   Has an appointment with Rheumatologist Dr. Mila Vera in July for    Has been having sever fatigue randomly. Went to [facility] with sever fatigue last week but all the test came back negative. Patient c/o snoring at night . Feels out of breath when she lays on bed. Patient Active Problem List   Diagnosis Code    Dyspnea R06.00    Anxiety F41.9    Weight gain R63.5    Elbow pain M25.529    Dyslipidemia E78.5    TIA (transient ischemic attack) G45.9    Precordial pain R07.2    Hyperlipidemia LDL goal <70 E78.5    Severe obesity (BMI 35.0-39. 9) E66.01    Type 2 diabetes mellitus without complication, without long-term current use of insulin (HCC) E11.9    Gastroesophageal reflux disease without esophagitis K21.9     Current Outpatient Medications   Medication Sig Dispense Refill    empagliflozin (Jardiance) 10 mg tablet Take 1 Tablet by mouth daily. 30 Tablet 1    ibuprofen (MOTRIN) 800 mg tablet TK 1 T PO Q 8 H PRN  0    Blood-Glucose Meter (ONETOUCH ULTRA2) monitoring kit USE TO CHECK FASTING MORNING BLOOD SUGAR ONCE DAILY 1 Kit 0    Lancets (ONETOUCH ULTRASOFT LANCETS) [facility] Check fasting morning blood sugar once/day.  1 Each 11    aspirin-acetaminophen-caffeine (EXCEDRIN ES) 250-250-65 mg per tablet Take 1 Tab by mouth every six (6) hours as needed for Headache.  escitalopram oxalate (LEXAPRO) 5 mg tablet  (Patient not taking: Reported on 7/7/2021)      propranoloL (INDERAL) 20 mg tablet  (Patient not taking: Reported on 7/7/2021)      clonazePAM (KlonoPIN) 0.5 mg tablet Take 1 Tab by mouth two (2) times daily as needed for Anxiety. Max Daily Amount: 1 mg. (Patient not taking: Reported on 7/7/2021) 15 Tab 0    metFORMIN ER (GLUCOPHAGE XR) 500 mg tablet Take 1 Tab by mouth two (2) times daily (with meals). (Patient not taking: Reported on 7/7/2021) 60 Tab 3    fluticasone (FLONASE) 50 mcg/actuation nasal spray 2 Sprays by Both Nostrils route daily as needed. (Patient not taking: Reported on 7/7/2021) 1 Bottle 1    LO LOESTRIN FE 1 mg-10 mcg (24)/10 mcg (2) tab TK 1 T PO QD (Patient not taking: Reported on 7/7/2021)  4    trihexyphenidyl (ARTANE) 0.4 mg/mL elix TK 5 ML PO BID (Patient not taking: Reported on 7/7/2021)  11    lansoprazole (PREVACID) 30 mg capsule Take  by mouth Daily (before breakfast). (Patient not taking: Reported on 7/7/2021)      glucose blood VI test strips (ONETOUCH ULTRA TEST) strip Check fasting morning blood sugar once/day 60 Strip 11    Lancets misc Check fasting sugar in the morning one time/day. (Patient not taking: Reported on 7/7/2021) 1 Each 11    butalbital-aspirin-caffeine (FIORINAL) capsule Take 1 Cap by mouth every six (6) hours as needed for Headache. Max Daily Amount: 4 Caps.  (Patient not taking: Reported on 7/7/2021) 20 Cap 0     Allergies   Allergen Reactions    Nubain [Nalbuphine] Swelling     Of throat and tongue    Phenergan [Promethazine] Swelling     Caused swelling tongue and throat    Stadol [Butorphanol Tartrate] Swelling     Of tongue and throat     Past Medical History:   Diagnosis Date    Diabetes ([address]     Hyperlipidemia     TIA (transient ischemic attack) 6/7/2016        Lab Results   Component Value Date/Time    WBC 7.1 12/21/2018 09:31 AM    HGB 11.5 12/21/2018 09:31 AM    HCT 35.1 12/21/2018 09:31 AM    PLATELET 076 70/23/9286 09:31 AM    MCV 91 12/21/2018 09:31 AM     Lab Results   Component Value Date/Time    Cholesterol, total 154 06/10/2020 08:36 AM    HDL Cholesterol 52 06/10/2020 08:36 AM    LDL, calculated 89 06/10/2020 08:36 AM    Triglyceride 67 06/10/2020 08:36 AM    CHOL/HDL Ratio 3.0 06/07/2016 04:48 AM     Lab Results   Component Value Date/Time    ALT (SGPT) 14 06/10/2020 08:36 AM    Alk. phosphatase 56 06/10/2020 08:36 AM    Bilirubin, total 0.5 06/10/2020 08:36 AM    Albumin 4.6 06/10/2020 08:36 AM    Protein, total 7.1 06/10/2020 08:36 AM    PLATELET 180 08/42/4637 09:31 AM     Lab Results   Component Value Date/Time    GFR est non- 06/10/2020 08:36 AM    GFR est  06/10/2020 08:36 AM    Creatinine 0.74 06/10/2020 08:36 AM    BUN 12 06/10/2020 08:36 AM    Sodium 136 06/10/2020 08:36 AM    Potassium 4.0 06/10/2020 08:36 AM    Chloride 102 06/10/2020 08:36 AM    CO2 24 06/10/2020 08:36 AM     Lab Results   Component Value Date/Time    TSH 0.603 12/21/2018 09:31 AM    T4, Free 1.29 12/21/2018 09:31 AM      Lab Results   Component Value Date/Time    Hemoglobin A1c 6.8 (H) 06/10/2020 08:36 AM    Hemoglobin A1c (POC) 7.9 11/21/2018 01:50 PM         ROS: random tightness in her right side of the face. No TIA's or unusual headaches, no dysphagia. No prolonged cough. No dyspnea or chest pain on exertion. No abdominal pain, change in bowel habits, black or bloody stools. No urinary tract symptoms. No new or unusual musculoskeletal symptoms. Specific concerns today: refer to HPI. Objective: The patient appears well, alert, oriented x 3, in no distress.   Visit Vitals  /73 (BP 1 Location: Right arm, BP Patient Position: Sitting, BP Cuff Size: Adult)   Pulse 89   Temp 98.4 °F (36.9 °C) (Oral)   Resp 16   Ht 5' 2\" (1.575 m)   Wt 194 lb 12.8 oz (88.4 kg)   LMP 06/25/2021 (Exact Date)   SpO2 99%   BMI 35.63 kg/m²     ENT normal.  Neck supple. No adenopathy or thyromegaly. EDUARDO. Lungs are clear, good air entry, no wheezes, rhonchi or rales. S1 and S2 normal, no murmurs, regular rate and rhythm. Abdomen soft without tenderness, guarding, mass or organomegaly. Extremities show no edema, normal peripheral pulses. Neurological is normal, no focal findings. Breast and Pelvic exams are deferred. Assessment/Plan:   Well Woman  lose weight, increase physical activity, follow low fat diet, follow low salt diet, routine labs ordered, call if any problems    ICD-10-CM ICD-9-CM    1. Well adult on routine health check  C46.96 W16.9 METABOLIC PANEL, COMPREHENSIVE      LIPID PANEL      THYROID CASCADE PROFILE      CBC WITH AUTOMATED DIFF      CBC WITH AUTOMATED DIFF      THYROID CASCADE PROFILE      LIPID PANEL      METABOLIC PANEL, COMPREHENSIVE   2. Type 2 diabetes mellitus without complication, without long-term current use of insulin ([facility])  R22.4 253.60 METABOLIC PANEL, COMPREHENSIVE      AMB POC HEMOGLOBIN A1C      empagliflozin (Jardiance) 10 mg tablet      MICROALBUMIN, UR, RAND W/ MICROALB/CREAT RATIO      MICROALBUMIN, UR, RAND W/ MICROALB/CREAT RATIO      METABOLIC PANEL, COMPREHENSIVE   3. Hyperlipidemia LDL goal <70  O48.5 367.0 METABOLIC PANEL, COMPREHENSIVE      LIPID PANEL      LIPID PANEL      METABOLIC PANEL, COMPREHENSIVE   4. Snoring  R06.83 786.09 SLEEP MEDICINE REFERRAL     Diagnoses and all orders for this visit:    1. Well adult on routine health check  -     METABOLIC PANEL, COMPREHENSIVE; Future  -     LIPID PANEL; Future  -     THYROID CASCADE PROFILE; Future  -     CBC WITH AUTOMATED DIFF; Future    2. Type 2 diabetes mellitus without complication, without long-term current use of insulin ([facility])  -     METABOLIC PANEL, COMPREHENSIVE;  Future  -     AMB POC HEMOGLOBIN A1C         Last Point of Care HGB A1C  Hemoglobin A1c (POC)   Date Value Ref Range Status   07/07/2021 8.7 % Final        -   Patient cannot tolerate metformin due to GI side effects. Start   empagliflozin (Jardiance) 10 mg tablet; Take 1 Tablet by mouth daily. Counseled on diet and exercise. -     MICROALBUMIN, UR, RAND W/ MICROALB/CREAT RATIO; Future        Bring BS log book. Will adjust med accordingly. 3. Hyperlipidemia LDL goal <79  -     METABOLIC PANEL, COMPREHENSIVE; Future  -     LIPID PANEL; Future    4. Snoring  -     SLEEP MEDICINE REFERRAL      Follow-up and Dispositions    · Return in about 6 weeks (around 8/18/2021), or if symptoms worsen or fail to improve, for Bring diabetic log book. .       reviewed diet, exercise and weight control  reviewed medications and side effects in detail  specific diabetic recommendations: low cholesterol diet, weight control and daily exercise discussed, all medications, side effects and compliance discussed carefully, foot care discussed and Podiatry visits discussed, annual eye examinations at Ophthalmology discussed and long term diabetic complications discussed

## 2021-07-07 NOTE — PROGRESS NOTES
Identified pt with two pt identifiers(name and ). Chief Complaint   Patient presents with    Physical    Skin Problem     Getting rashes from being out in the sun     Mammo - 2021 [facility]   Pap- due September     3 most recent PHQ Screens 2021   Little interest or pleasure in doing things Not at all   Feeling down, depressed, irritable, or hopeless Not at all   Total Score PHQ 2 0   Trouble falling or staying asleep, or sleeping too much -   Feeling tired or having little energy -   Poor appetite, weight loss, or overeating -   Feeling bad about yourself - or that you are a failure or have let yourself or your family down -   Trouble concentrating on things such as school, work, reading, or watching TV -   Moving or speaking so slowly that other people could have noticed; or the opposite being so fidgety that others notice -   Thoughts of being better off dead, or hurting yourself in some way -   PHQ 9 Score -   How difficult have these problems made it for you to do your work, take care of your home and get along with others -        Vitals:    21 1131   BP: 121/73   Pulse: 89   Resp: 16   Temp: 98.4 °F (36.9 °C)   TempSrc: Oral   SpO2: 99%   Weight: 194 lb 12.8 oz (88.4 kg)   Height: 5' 2\" (1.575 m)   PainSc:   3   PainLoc: Generalized   LMP: 2021       Health Maintenance Due   Topic    Hepatitis C Screening     Pneumococcal 0-64 years (1 of 2 - PPSV23)    Eye Exam Retinal or Dilated     COVID-19 Vaccine (1)    DTaP/Tdap/Td series (1 - Tdap)    PAP AKA CERVICAL CYTOLOGY     Foot Exam Q1     A1C test (Diabetic or Prediabetic)     MICROALBUMIN Q1     Lipid Screen        1. Have you been to the ER, urgent care clinic since your last visit? Hospitalized since your last visit? [facility] - Rash on neck and back for sun exposure     2.  Have you seen or consulted any other health care providers outside of the [address] [place] [address] since your last visit? Include any pap smears or colon screening.  Dermatology - Nuria Maurice

## 2021-07-08 LAB — TSH SERPL-ACNC: 0.7 UIU/ML (ref 0.45–4.5)

## 2021-07-09 ENCOUNTER — TELEPHONE (OUTPATIENT)
Dept: PRIMARY CARE CLINIC | Age: 41
End: 2021-07-09

## 2021-07-09 NOTE — TELEPHONE ENCOUNTER
----- Message from Graham Tejada sent at 7/8/2021  5:22 PM EDT -----  Regarding: Dr. Kati Marvin: [phone]  General Message/Vendor Calls    Caller's first and last name: N/A      Reason for call: Requesting update regarding request: Diana Gallo is needing Prior Auth with the pharmacy,  please call [phone]      Callback required yes/no and why: yes/follow up       Best contact number(s): [phone]      Details to clarify the request: N/A      Graham Tejada

## 2021-07-12 ENCOUNTER — TELEPHONE (OUTPATIENT)
Dept: PRIMARY CARE CLINIC | Age: 41
End: 2021-07-12

## 2021-07-12 ENCOUNTER — OFFICE VISIT (OUTPATIENT)
Dept: PRIMARY CARE CLINIC | Age: 41
End: 2021-07-12
Payer: COMMERCIAL

## 2021-07-12 VITALS
SYSTOLIC BLOOD PRESSURE: 115 MMHG | HEIGHT: 62 IN | HEART RATE: 90 BPM | TEMPERATURE: 97.6 F | OXYGEN SATURATION: 100 % | WEIGHT: 193.4 LBS | RESPIRATION RATE: 16 BRPM | BODY MASS INDEX: 35.59 KG/M2 | DIASTOLIC BLOOD PRESSURE: 73 MMHG

## 2021-07-12 DIAGNOSIS — N89.8 VAGINAL ITCHING: ICD-10-CM

## 2021-07-12 DIAGNOSIS — R30.9 URINARY PAIN: Primary | ICD-10-CM

## 2021-07-12 DIAGNOSIS — E78.5 HYPERLIPIDEMIA LDL GOAL <70: ICD-10-CM

## 2021-07-12 LAB
BILIRUB UR QL STRIP: NEGATIVE
GLUCOSE UR-MCNC: NORMAL MG/DL
KETONES P FAST UR STRIP-MCNC: NEGATIVE MG/DL
PH UR STRIP: 6 [PH] (ref 4.6–8)
PROT UR QL STRIP: NEGATIVE
SP GR UR STRIP: 1.01 (ref 1–1.03)
UA UROBILINOGEN AMB POC: NORMAL (ref 0.2–1)
URINALYSIS CLARITY POC: CLEAR
URINALYSIS COLOR POC: YELLOW
URINE BLOOD POC: NORMAL
URINE LEUKOCYTES POC: NORMAL
URINE NITRITES POC: NEGATIVE

## 2021-07-12 PROCEDURE — 99214 OFFICE O/P EST MOD 30 MIN: CPT | Performed by: FAMILY MEDICINE

## 2021-07-12 PROCEDURE — 81003 URINALYSIS AUTO W/O SCOPE: CPT | Performed by: FAMILY MEDICINE

## 2021-07-12 RX ORDER — ATORVASTATIN CALCIUM 10 MG/1
10 TABLET, FILM COATED ORAL
Qty: 90 TABLET | Refills: 0 | Status: SHIPPED | OUTPATIENT
Start: 2021-07-12 | End: 2021-10-19

## 2021-07-12 RX ORDER — FLUCONAZOLE 150 MG/1
TABLET ORAL
Qty: 2 TABLET | Refills: 0 | Status: SHIPPED | OUTPATIENT
Start: 2021-07-12 | End: 2021-10-18 | Stop reason: ALTCHOICE

## 2021-07-12 NOTE — PROGRESS NOTES
Identified pt with two pt identifiers(name and ). Chief Complaint   Patient presents with    UTI     urinary irritation- took bath , ithcing burning         3 most recent PHQ Screens 2021   Little interest or pleasure in doing things Not at all   Feeling down, depressed, irritable, or hopeless Not at all   Total Score PHQ 2 0   Trouble falling or staying asleep, or sleeping too much -   Feeling tired or having little energy -   Poor appetite, weight loss, or overeating -   Feeling bad about yourself - or that you are a failure or have let yourself or your family down -   Trouble concentrating on things such as school, work, reading, or watching TV -   Moving or speaking so slowly that other people could have noticed; or the opposite being so fidgety that others notice -   Thoughts of being better off dead, or hurting yourself in some way -   PHQ 9 Score -   How difficult have these problems made it for you to do your work, take care of your home and get along with others -        Vitals:    21 1601   BP: 115/73   Pulse: 90   Resp: 16   Temp: 97.6 °F (36.4 °C)   TempSrc: Temporal   SpO2: 100%   Weight: 193 lb 6.4 oz (87.7 kg)   Height: 5' 2\" (1.575 m)   PainSc:   0 - No pain   LMP: 2021       Health Maintenance Due   Topic    Hepatitis C Screening     Pneumococcal 0-64 years (1 of 2 - PPSV23)    Eye Exam Retinal or Dilated     COVID-19 Vaccine (1)    DTaP/Tdap/Td series (1 - Tdap)    PAP AKA CERVICAL CYTOLOGY     Foot Exam Q1        1. Have you been to the ER, urgent care clinic since your last visit? Hospitalized since your last visit? No    2. Have you seen or consulted any other health care providers outside of the [address] [place] [address] since your last visit? Include any pap smears or colon screening.  No

## 2021-07-12 NOTE — PROGRESS NOTES
Subjective:     Chief Complaint   Patient presents with    UTI     urinary irritation- took bath , ithcing burning         She  is a 38y.o. year old female with DM who presents today with a complain vaginal itching, whitish vaginal discharge, redness started about a week ago but form yesterday she has been having burning sensation when urinates. Denies any fever, chills, back pain. She is here with her . Patient just started taking Jardiance last week for diabetes. She reports that BS has been doing lot better since she started taking the med. I reviewed lab results with her today. Last Point of Care HGB A1C  Hemoglobin A1c (POC)   Date Value Ref Range Status   07/07/2021 8.7 % Final          Lab Results   Component Value Date/Time    Cholesterol, total 204 (H) 07/07/2021 01:22 PM    HDL Cholesterol 60 07/07/2021 01:22 PM    LDL, calculated 125 (H) 07/07/2021 01:22 PM    VLDL, calculated 19 07/07/2021 01:22 PM    Triglyceride 95 07/07/2021 01:22 PM    CHOL/HDL Ratio 3.4 07/07/2021 01:22 PM         Pertinent items are noted in HPI.   Objective:     Vitals:    07/12/21 1601   BP: 115/73   Pulse: 90   Resp: 16   Temp: 97.6 °F (36.4 °C)   TempSrc: Temporal   SpO2: 100%   Weight: 193 lb 6.4 oz (87.7 kg)   Height: 5' 2\" (1.575 m)       Physical Examination: General appearance - alert, well appearing, and in no distress, oriented to person, place, and time and overweight  Mental status - alert, oriented to person, place, and time, normal mood, behavior, speech, dress, motor activity, and thought processes  Chest - clear to auscultation, no wheezes, rales or rhonchi, symmetric air entry  Heart - normal rate, regular rhythm, normal S1, S2, no murmurs, rubs, clicks or gallops    Allergies   Allergen Reactions    Nubain [Nalbuphine] Swelling     Of throat and tongue    Phenergan [Promethazine] Swelling     Caused swelling tongue and throat    Stadol [Butorphanol Tartrate] Swelling     Of tongue and throat Social History     Socioeconomic History    Marital status:      Spouse name: Not on file    Number of children: Not on file    Years of education: Not on file    Highest education level: Not on file   Tobacco Use    Smoking status: Never Smoker    Smokeless tobacco: Never Used   Vaping Use    Vaping Use: Never used   Substance and Sexual Activity    Alcohol use: Yes     Alcohol/week: 0.0 standard drinks     Comment: Occasional    Drug use: No    Sexual activity: Yes     Partners: Male     Birth control/protection: Surgical     Social Determinants of Health     Financial Resource Strain:     Difficulty of Paying Living Expenses:    Food Insecurity:     Worried About Running Out of Food in the Last Year:     Ran Out of Food in the Last Year:    Transportation Needs:     Lack of Transportation (Medical):      Lack of Transportation (Non-Medical):    Physical Activity:     Days of Exercise per Week:     Minutes of Exercise per Session:    Stress:     Feeling of Stress :    Social Connections:     Frequency of Communication with Friends and Family:     Frequency of Social Gatherings with Friends and Family:     Attends [employer] Services:     Active Member of Clubs or Organizations:     Attends Club or Organization Meetings:     Marital Status:       Family History   Problem Relation Age of Onset    Hypertension Mother     Elevated Lipids Mother     Neuropathy Mother     Headache Father     Hypertension Brother     Stroke Maternal Aunt     Hypertension Maternal Grandmother     Cancer Maternal Grandmother         throat cancer    Stroke Maternal Uncle       Past Surgical History:   Procedure Laterality Date    HX TUBAL LIGATION  2009    HX WISDOM TEETH EXTRACTION        Past Medical History:   Diagnosis Date    Diabetes ([facility] [address]     Hyperlipidemia     TIA (transient ischemic attack) 6/7/2016      Current Outpatient Medications   Medication Sig Dispense Refill    empagliflozin (Jardiance) 10 mg tablet Take 1 Tablet by mouth daily. 30 Tablet 1    ibuprofen (MOTRIN) 800 mg tablet TK 1 T PO Q 8 H PRN  0    Blood-Glucose Meter (ONETOUCH ULTRA2) monitoring kit USE TO CHECK FASTING MORNING BLOOD SUGAR ONCE DAILY 1 Kit 0    glucose blood VI test strips (ONETOUCH ULTRA TEST) strip Check fasting morning blood sugar once/day 60 Strip 11    Lancets (ONETOUCH ULTRASOFT LANCETS) misc Check fasting morning blood sugar once/day. 1 Each 11    aspirin-acetaminophen-caffeine (EXCEDRIN ES) 250-250-65 mg per tablet Take 1 Tab by mouth every six (6) hours as needed for Headache.  escitalopram oxalate (LEXAPRO) 5 mg tablet  (Patient not taking: Reported on 7/7/2021)      propranoloL (INDERAL) 20 mg tablet  (Patient not taking: Reported on 7/7/2021)      clonazePAM (KlonoPIN) 0.5 mg tablet Take 1 Tab by mouth two (2) times daily as needed for Anxiety. Max Daily Amount: 1 mg. (Patient not taking: Reported on 7/7/2021) 15 Tab 0    metFORMIN ER (GLUCOPHAGE XR) 500 mg tablet Take 1 Tab by mouth two (2) times daily (with meals). (Patient not taking: Reported on 7/7/2021) 60 Tab 3    fluticasone (FLONASE) 50 mcg/actuation nasal spray 2 Sprays by Both Nostrils route daily as needed. (Patient not taking: Reported on 7/7/2021) 1 Bottle 1    LO LOESTRIN FE 1 mg-10 mcg (24)/10 mcg (2) tab TK 1 T PO QD (Patient not taking: Reported on 7/7/2021)  4    trihexyphenidyl (ARTANE) 0.4 mg/mL elix TK 5 ML PO BID (Patient not taking: Reported on 7/7/2021)  11    lansoprazole (PREVACID) 30 mg capsule Take  by mouth Daily (before breakfast). (Patient not taking: Reported on 7/7/2021)      Lancets misc Check fasting sugar in the morning one time/day. (Patient not taking: Reported on 7/7/2021) 1 Each 11    butalbital-aspirin-caffeine (FIORINAL) capsule Take 1 Cap by mouth every six (6) hours as needed for Headache. Max Daily Amount: 4 Caps.  (Patient not taking: Reported on 7/7/2021) 20 Cap 0        Assessment/ Plan: Diagnoses and all orders for this visit:    1. Urinary pain  -     AMB POC URINALYSIS DIP STICK AUTO W/O MICRO- trace leucocytes. Her symptoms likely from yeast infection from uncontrolled diabetes. Will wait for Cx.   -     CULTURE, URINE; Future    2. Vaginal itching  -    Start  fluconazole (DIFLUCAN) 150 mg tablet; Take one tab today. Okay to tkle one more tab in 4-5 days if symptoms does not resolve. 3. Hyperlipidemia LDL goal <70  -   Start atorvastatin (LIPITOR) 10 mg tablet; Take 1 Tablet by mouth nightly. Follow up in 3 months. Medication risks/benefits/costs/interactions/alternatives discussed with patient. Advised patient to call back or return to office if symptoms worsen/change/persist. If patient cannot reach us or should anything more severe/urgent arise he/she should proceed directly to the nearest emergency department. Discussed expected course/resolution/complications of diagnosis in detail with patient. Patient given a written after visit summary which includes her diagnoses, current medications and vitals. Patient expressed understanding with the diagnosis and plan. Follow-up and Dispositions    · Return if symptoms worsen or fail to improve, for as scheduled for DM., Bring diabetic log book. Nagi Grace

## 2021-07-14 LAB
BACTERIA SPEC CULT: NORMAL
SERVICE CMNT-IMP: NORMAL

## 2021-07-14 NOTE — PROGRESS NOTES
Sent via my chart. Hi,   Urine culture showed that you do not have any urine infection. Your symptoms are from having uncontrolled blood sugar as I have discussed. Hope you are feeling better with fluconazole and the diabetes med . Thanks.  Dr. Rudi Ochoa

## 2021-08-18 ENCOUNTER — OFFICE VISIT (OUTPATIENT)
Dept: PRIMARY CARE CLINIC | Age: 41
End: 2021-08-18
Payer: COMMERCIAL

## 2021-08-18 VITALS
HEIGHT: 62 IN | HEART RATE: 83 BPM | TEMPERATURE: 98.2 F | BODY MASS INDEX: 35.04 KG/M2 | RESPIRATION RATE: 16 BRPM | SYSTOLIC BLOOD PRESSURE: 117 MMHG | DIASTOLIC BLOOD PRESSURE: 72 MMHG | WEIGHT: 190.4 LBS | OXYGEN SATURATION: 100 %

## 2021-08-18 DIAGNOSIS — E11.9 TYPE 2 DIABETES MELLITUS WITHOUT COMPLICATION, WITHOUT LONG-TERM CURRENT USE OF INSULIN (HCC): Primary | ICD-10-CM

## 2021-08-18 DIAGNOSIS — E78.5 HYPERLIPIDEMIA LDL GOAL <70: ICD-10-CM

## 2021-08-18 LAB — GLUCOSE POC: 105 MG/DL

## 2021-08-18 PROCEDURE — 99213 OFFICE O/P EST LOW 20 MIN: CPT | Performed by: FAMILY MEDICINE

## 2021-08-18 PROCEDURE — 82962 GLUCOSE BLOOD TEST: CPT | Performed by: FAMILY MEDICINE

## 2021-08-18 PROCEDURE — 3052F HG A1C>EQUAL 8.0%<EQUAL 9.0%: CPT | Performed by: FAMILY MEDICINE

## 2021-08-18 NOTE — PROGRESS NOTES
Subjective:     Chief Complaint   Patient presents with    Diabetes        She  is a 38y.o. year old female who presents for follow up on diabetes. Refer to last office notes. In last visit she was started Jardiance 10 mg. She  has been tolerating the med . No side effects. FBS has been running in 150s range which much better than 200s range prior to  Start of [facility]. She is unable to take Metformin due to GI side effect. Has been taking Lipitor 10 mg. No side effects. Denies any chest pain, soa, cough, abdominal pain. Last Point of Care HGB A1C  Hemoglobin A1c (POC)   Date Value Ref Range Status   07/07/2021 8.7 % Final          Pertinent items are noted in HPI.   Objective:     Vitals:    08/18/21 1141   BP: 117/72   Pulse: 83   Resp: 16   Temp: 98.2 °F (36.8 °C)   TempSrc: Temporal   SpO2: 100%   Weight: 190 lb 6.4 oz (86.4 kg)   Height: 5' 2\" (1.575 m)       Physical Examination: General appearance - alert, well appearing, and in no distress, oriented to person, place, and time and overweight  Mental status - alert, oriented to person, place, and time, normal mood, behavior, speech, dress, motor activity, and thought processes  Chest - clear to auscultation, no wheezes, rales or rhonchi, symmetric air entry  Heart - normal rate, regular rhythm, normal S1, S2, no murmurs, rubs, clicks or gallops    Allergies   Allergen Reactions    Nubain [Nalbuphine] Swelling     Of throat and tongue    Phenergan [Promethazine] Swelling     Caused swelling tongue and throat    Stadol [Butorphanol Tartrate] Swelling     Of tongue and throat      Social History     Socioeconomic History    Marital status:      Spouse name: Not on file    Number of children: Not on file    Years of education: Not on file    Highest education level: Not on file   Tobacco Use    Smoking status: Never Smoker    Smokeless tobacco: Never Used   Vaping Use    Vaping Use: Never used   Substance and Sexual Activity  Alcohol use: Yes     Alcohol/week: 0.0 standard drinks     Comment: Occasional    Drug use: No    Sexual activity: Yes     Partners: Male     Birth control/protection: Surgical     Social Determinants of Health     Financial Resource Strain:     Difficulty of Paying Living Expenses:    Food Insecurity:     Worried About Running Out of Food in the Last Year:     Ran Out of Food in the Last Year:    Transportation Needs:     Lack of Transportation (Medical):  Lack of Transportation (Non-Medical):    Physical Activity:     Days of Exercise per Week:     Minutes of Exercise per Session:    Stress:     Feeling of Stress :    Social Connections:     Frequency of Communication with Friends and Family:     Frequency of Social Gatherings with Friends and Family:     Attends [employer] Services:     Active Member of Clubs or Organizations:     Attends Club or Organization Meetings:     Marital Status:       Family History   Problem Relation Age of Onset    Hypertension Mother     Elevated Lipids Mother     Neuropathy Mother     Headache Father     Hypertension Brother     Stroke Maternal Aunt     Hypertension Maternal Grandmother     Cancer Maternal Grandmother         throat cancer    Stroke Maternal Uncle       Past Surgical History:   Procedure Laterality Date    HX TUBAL LIGATION  2009    HX WISDOM TEETH EXTRACTION        Past Medical History:   Diagnosis Date    Diabetes ([facility] [address]     Hyperlipidemia     TIA (transient ischemic attack) 6/7/2016      Current Outpatient Medications   Medication Sig Dispense Refill    atorvastatin (LIPITOR) 10 mg tablet Take 1 Tablet by mouth nightly. 90 Tablet 0    empagliflozin (Jardiance) 10 mg tablet Take 1 Tablet by mouth daily.  30 Tablet 1    ibuprofen (MOTRIN) 800 mg tablet TK 1 T PO Q 8 H PRN  0    Blood-Glucose Meter (ONETOUCH ULTRA2) monitoring kit USE TO CHECK FASTING MORNING BLOOD SUGAR ONCE DAILY 1 Kit 0    glucose blood VI test strips (ONETOUCH ULTRA TEST) strip Check fasting morning blood sugar once/day 60 Strip 11    Lancets (ONETOUCH ULTRASOFT LANCETS) misc Check fasting morning blood sugar once/day. 1 Each 11    aspirin-acetaminophen-caffeine (EXCEDRIN ES) 250-250-65 mg per tablet Take 1 Tab by mouth every six (6) hours as needed for Headache.  fluconazole (DIFLUCAN) 150 mg tablet Take one tab today. Okay to tkle one more tab in 4-5 days if symptoms does not resolve. (Patient not taking: Reported on 8/18/2021) 2 Tablet 0    propranoloL (INDERAL) 20 mg tablet  (Patient not taking: Reported on 7/7/2021)      clonazePAM (KlonoPIN) 0.5 mg tablet Take 1 Tab by mouth two (2) times daily as needed for Anxiety. Max Daily Amount: 1 mg. (Patient not taking: Reported on 7/7/2021) 15 Tab 0    metFORMIN ER (GLUCOPHAGE XR) 500 mg tablet Take 1 Tab by mouth two (2) times daily (with meals). (Patient not taking: Reported on 7/7/2021) 60 Tab 3    fluticasone (FLONASE) 50 mcg/actuation nasal spray 2 Sprays by Both Nostrils route daily as needed. (Patient not taking: Reported on 7/7/2021) 1 Bottle 1    LO LOESTRIN FE 1 mg-10 mcg (24)/10 mcg (2) tab TK 1 T PO QD (Patient not taking: Reported on 7/7/2021)  4    trihexyphenidyl (ARTANE) 0.4 mg/mL elix TK 5 ML PO BID (Patient not taking: Reported on 7/7/2021)  11    lansoprazole (PREVACID) 30 mg capsule Take  by mouth Daily (before breakfast). (Patient not taking: Reported on 7/7/2021)      Lancets misc Check fasting sugar in the morning one time/day. (Patient not taking: Reported on 7/7/2021) 1 Each 11        Assessment/ Plan:   Diagnoses and all orders for this visit:    1. Type 2 diabetes mellitus without complication, without long-term current use of insulin (HCC)  -     AMB POC GLUCOSE BLOOD, BY GLUCOSE MONITORING DEVICE-  105  -    Increase  empagliflozin (Jardiance) 25 mg tablet; Take 1 Tablet by mouth daily. Continue work on diet and exercise  2.  Hyperlipidemia LDL goal <70 Continue Lipitor. Medication risks/benefits/costs/interactions/alternatives discussed with patient. Advised patient to call back or return to office if symptoms worsen/change/persist. If patient cannot reach us or should anything more severe/urgent arise he/she should proceed directly to the nearest emergency department. Discussed expected course/resolution/complications of diagnosis in detail with patient. Patient given a written after visit summary which includes her diagnoses, current medications and vitals. Patient expressed understanding with the diagnosis and plan. Follow-up and Dispositions    · Return in about 2 months (around 10/18/2021), or if symptoms worsen or fail to improve, for cholesterol, Bring diabetic log book. Nancy Records

## 2021-08-18 NOTE — PROGRESS NOTES
Identified pt with two pt identifiers(name and ). Chief Complaint   Patient presents with    Diabetes        3 most recent PHQ Screens 2021   Little interest or pleasure in doing things Not at all   Feeling down, depressed, irritable, or hopeless Not at all   Total Score PHQ 2 0   Trouble falling or staying asleep, or sleeping too much -   Feeling tired or having little energy -   Poor appetite, weight loss, or overeating -   Feeling bad about yourself - or that you are a failure or have let yourself or your family down -   Trouble concentrating on things such as school, work, reading, or watching TV -   Moving or speaking so slowly that other people could have noticed; or the opposite being so fidgety that others notice -   Thoughts of being better off dead, or hurting yourself in some way -   PHQ 9 Score -   How difficult have these problems made it for you to do your work, take care of your home and get along with others -        Vitals:    21 1141   Resp: 16   Weight: 190 lb 6.4 oz (86.4 kg)   Height: 5' 2\" (1.575 m)       Health Maintenance Due   Topic    Hepatitis C Screening     Eye Exam Retinal or Dilated     COVID-19 Vaccine (1)    DTaP/Tdap/Td series (1 - Tdap)    PAP AKA CERVICAL CYTOLOGY     Foot Exam Q1        1. Have you been to the ER, urgent care clinic since your last visit? Hospitalized since your last visit? No    2. Have you seen or consulted any other health care providers outside of the [address] [place] [address] since your last visit? Include any pap smears or colon screening.  No

## 2021-09-17 ENCOUNTER — TELEPHONE (OUTPATIENT)
Dept: PRIMARY CARE CLINIC | Age: 41
End: 2021-09-17

## 2021-09-17 NOTE — TELEPHONE ENCOUNTER
----- Message from Shawn Hunter sent at 9/17/2021  8:43 AM EDT -----  Regarding: /Telephone  Contact: [phone]  General Message/Vendor Calls    Caller's first and last name: Pt.       Reason for call: Pcp called in Rx refill for Jardiance 25 mg, however cost was too high, would like to go back to 10 mg. Will need 10 mg refill sent to pharmacy. Callback required yes/no and why: Yes, call back from nurse. Best contact number(s): [phone]      Details to clarify the request: N/a.       Shawn Hunter

## 2021-09-17 NOTE — TELEPHONE ENCOUNTER
Spoke to patient and she stated the cost of the 25mg jardiance was over 300$ so she wants to go back to the 10mg

## 2021-09-17 NOTE — TELEPHONE ENCOUNTER
[id number] Paula funez PA needed pharmacy stated that the 3 month supply was over 300$. A one month supply is 69.99.      I spoke to patient and informed that price was ok for the 1 month supply

## 2021-09-20 NOTE — TELEPHONE ENCOUNTER
Spoke to patient and informed I have copay coupon card she can use to bring down the price of her jardiance it will be on my desk.  Patient will try to pick it up tomorrow

## 2021-10-18 ENCOUNTER — OFFICE VISIT (OUTPATIENT)
Dept: PRIMARY CARE CLINIC | Age: 41
End: 2021-10-18
Payer: COMMERCIAL

## 2021-10-18 VITALS
BODY MASS INDEX: 34.82 KG/M2 | WEIGHT: 189.2 LBS | RESPIRATION RATE: 16 BRPM | DIASTOLIC BLOOD PRESSURE: 69 MMHG | TEMPERATURE: 97.8 F | OXYGEN SATURATION: 99 % | HEART RATE: 72 BPM | SYSTOLIC BLOOD PRESSURE: 109 MMHG | HEIGHT: 62 IN

## 2021-10-18 DIAGNOSIS — E78.5 HYPERLIPIDEMIA LDL GOAL <70: ICD-10-CM

## 2021-10-18 DIAGNOSIS — E11.9 TYPE 2 DIABETES MELLITUS WITHOUT COMPLICATION, WITHOUT LONG-TERM CURRENT USE OF INSULIN (HCC): Primary | ICD-10-CM

## 2021-10-18 LAB — HBA1C MFR BLD HPLC: 6.4 %

## 2021-10-18 PROCEDURE — 99214 OFFICE O/P EST MOD 30 MIN: CPT | Performed by: FAMILY MEDICINE

## 2021-10-18 PROCEDURE — 83036 HEMOGLOBIN GLYCOSYLATED A1C: CPT | Performed by: FAMILY MEDICINE

## 2021-10-18 NOTE — PROGRESS NOTES
Identified pt with two pt identifiers(name and ). Chief Complaint   Patient presents with    Cholesterol Problem    Hypertension        3 most recent PHQ Screens 10/18/2021   Little interest or pleasure in doing things Not at all   Feeling down, depressed, irritable, or hopeless Not at all   Total Score PHQ 2 0   Trouble falling or staying asleep, or sleeping too much -   Feeling tired or having little energy -   Poor appetite, weight loss, or overeating -   Feeling bad about yourself - or that you are a failure or have let yourself or your family down -   Trouble concentrating on things such as school, work, reading, or watching TV -   Moving or speaking so slowly that other people could have noticed; or the opposite being so fidgety that others notice -   Thoughts of being better off dead, or hurting yourself in some way -   PHQ 9 Score -   How difficult have these problems made it for you to do your work, take care of your home and get along with others -        Vitals:    10/18/21 1503   BP: 109/69   Pulse: 72   Resp: 16   Temp: 97.8 °F (36.6 °C)   TempSrc: Temporal   SpO2: 99%   Weight: 189 lb 3.2 oz (85.8 kg)   Height: 5' 2\" (1.575 m)   PainSc:   0 - No pain   LMP: 2021       Health Maintenance Due   Topic    Hepatitis C Screening     Eye Exam Retinal or Dilated     COVID-19 Vaccine (1)    DTaP/Tdap/Td series (1 - Tdap)    Foot Exam Q1     Flu Vaccine (1)    Cervical cancer screen        1. Have you been to the ER, urgent care clinic since your last visit? Hospitalized since your last visit? No    2. Have you seen or consulted any other health care providers outside of the [address] [place] [address] since your last visit? Include any pap smears or colon screening.  No

## 2021-10-18 NOTE — PROGRESS NOTES
Subjective:     Chief Complaint   Patient presents with    Cholesterol Problem    Hypertension        She  is a 38y.o. year old female who presents for follow up on diabetes and HLD. She has been on Jardiance 25 mg. She  has been tolerating the med. No side effects. FBS has been running in 98-140s range . She is unable to take Metformin due to GI side effect.      Has been taking Lipitor 10 mg. No side effects.     Denies any chest pain, soa, cough, abdominal pain. Lab Results   Component Value Date/Time    Cholesterol, total 204 (H) 07/07/2021 01:22 PM    HDL Cholesterol 60 07/07/2021 01:22 PM    LDL, calculated 125 (H) 07/07/2021 01:22 PM    VLDL, calculated 19 07/07/2021 01:22 PM    Triglyceride 95 07/07/2021 01:22 PM    CHOL/HDL Ratio 3.4 07/07/2021 01:22 PM         Lab Results   Component Value Date/Time    Hemoglobin A1c 6.8 (H) 06/10/2020 08:36 AM    Hemoglobin A1c (POC) 8.7 07/07/2021 11:58 AM         Pertinent items are noted in HPI. Objective:     Vitals:    10/18/21 1503   BP: 109/69   Pulse: 72   Resp: 16   Temp: 97.8 °F (36.6 °C)   TempSrc: Temporal   SpO2: 99%   Weight: 189 lb 3.2 oz (85.8 kg)   Height: 5' 2\" (1.575 m)       Physical Examination: General appearance - alert, well appearing, and in no distress, oriented to person, place, and time and overweight  Mental status - alert, oriented to person, place, and time, normal mood, behavior, speech, dress, motor activity, and thought processes  Chest - clear to auscultation, no wheezes, rales or rhonchi, symmetric air entry  Heart - normal rate, regular rhythm, normal S1, S2, no murmurs, rubs, clicks or gallops  Extremities - peripheral pulses normal, no pedal edema, no clubbing or cyanosis, feet normal, good pulses, normal color, temperature and sensation, monofilament sensory exam is normal in both feet.     Allergies   Allergen Reactions    Nubain [Nalbuphine] Swelling     Of throat and tongue    Phenergan [Promethazine] Swelling Caused swelling tongue and throat    Stadol [Butorphanol Tartrate] Swelling     Of tongue and throat      Social History     Socioeconomic History    Marital status:      Spouse name: Not on file    Number of children: Not on file    Years of education: Not on file    Highest education level: Not on file   Tobacco Use    Smoking status: Never Smoker    Smokeless tobacco: Never Used   Vaping Use    Vaping Use: Never used   Substance and Sexual Activity    Alcohol use: Yes     Alcohol/week: 0.0 standard drinks     Comment: Occasional    Drug use: No    Sexual activity: Yes     Partners: Male     Birth control/protection: Surgical     Social Determinants of Health     Financial Resource Strain:     Difficulty of Paying Living Expenses:    Food Insecurity:     Worried About Running Out of Food in the Last Year:     Ran Out of Food in the Last Year:    Transportation Needs:     Lack of Transportation (Medical):      Lack of Transportation (Non-Medical):    Physical Activity:     Days of Exercise per Week:     Minutes of Exercise per Session:    Stress:     Feeling of Stress :    Social Connections:     Frequency of Communication with Friends and Family:     Frequency of Social Gatherings with Friends and Family:     Attends Anab[employer]ist Services:     Active Member of Clubs or Organizations:     Attends Club or Organization Meetings:     Marital Status:       Family History   Problem Relation Age of Onset    Hypertension Mother     Elevated Lipids Mother     Neuropathy Mother     Headache Father     Hypertension Brother     Stroke Maternal Aunt     Hypertension Maternal Grandmother     Cancer Maternal Grandmother         throat cancer    Stroke Maternal Uncle       Past Surgical History:   Procedure Laterality Date    HX TUBAL LIGATION  2009    HX WISDOM TEETH EXTRACTION        Past Medical History:   Diagnosis Date    Diabetes ([facility] [address]     Hyperlipidemia     TIA (transient ischemic attack) 6/7/2016      Current Outpatient Medications   Medication Sig Dispense Refill    empagliflozin (Jardiance) 25 mg tablet Take 1 Tablet by mouth daily. 90 Tablet 0    atorvastatin (LIPITOR) 10 mg tablet Take 1 Tablet by mouth nightly. 90 Tablet 0    ibuprofen (MOTRIN) 800 mg tablet TK 1 T PO Q 8 H PRN  0    Blood-Glucose Meter (ONETOUCH ULTRA2) monitoring kit USE TO CHECK FASTING MORNING BLOOD SUGAR ONCE DAILY 1 Kit 0    glucose blood VI test strips (ONETOUCH ULTRA TEST) strip Check fasting morning blood sugar once/day 60 Strip 11    Lancets (ONETOUCH ULTRASOFT LANCETS) misc Check fasting morning blood sugar once/day. 1 Each 11    aspirin-acetaminophen-caffeine (EXCEDRIN ES) 250-250-65 mg per tablet Take 1 Tab by mouth every six (6) hours as needed for Headache.  fluconazole (DIFLUCAN) 150 mg tablet Take one tab today. Okay to tkle one more tab in 4-5 days if symptoms does not resolve. (Patient not taking: Reported on 8/18/2021) 2 Tablet 0    propranoloL (INDERAL) 20 mg tablet  (Patient not taking: Reported on 7/7/2021)      metFORMIN ER (GLUCOPHAGE XR) 500 mg tablet Take 1 Tab by mouth two (2) times daily (with meals). (Patient not taking: Reported on 7/7/2021) 60 Tab 3    fluticasone (FLONASE) 50 mcg/actuation nasal spray 2 Sprays by Both Nostrils route daily as needed. (Patient not taking: Reported on 7/7/2021) 1 Bottle 1    LO LOESTRIN FE 1 mg-10 mcg (24)/10 mcg (2) tab TK 1 T PO QD (Patient not taking: Reported on 7/7/2021)  4    trihexyphenidyl (ARTANE) 0.4 mg/mL elix TK 5 ML PO BID (Patient not taking: Reported on 7/7/2021)  11    lansoprazole (PREVACID) 30 mg capsule Take  by mouth Daily (before breakfast). (Patient not taking: Reported on 7/7/2021)      Lancets misc Check fasting sugar in the morning one time/day. (Patient not taking: Reported on 10/18/2021) 1 Each 11        Assessment/ Plan:   Diagnoses and all orders for this visit:    1.  Type 2 diabetes mellitus without complication, without long-term current use of insulin (HCC)  -     AMB POC HEMOGLOBIN A1C          Last Point of Care HGB A1C  Hemoglobin A1c (POC)   Date Value Ref Range Status   10/18/2021 6.4 % Final      Greatly improved. Continue to work on diet and exercise and continue Jardiance. -      DIABETES FOOT EXAM  -     METABOLIC PANEL, COMPREHENSIVE; Future  -     empagliflozin (Jardiance) 25 mg tablet; Take 1 Tablet by mouth daily. 2. Hyperlipidemia LDL goal <70  -     LIPID PANEL; Future               Continue Lipitor. Medication risks/benefits/costs/interactions/alternatives discussed with patient. Advised patient to call back or return to office if symptoms worsen/change/persist. If patient cannot reach us or should anything more severe/urgent arise he/she should proceed directly to the nearest emergency department. Discussed expected course/resolution/complications of diagnosis in detail with patient. Patient given a written after visit summary which includes her diagnoses, current medications and vitals. Patient expressed understanding with the diagnosis and plan. Follow-up and Dispositions    · Return in about 3 months (around 1/18/2022), or if symptoms worsen or fail to improve, for Bring diabetic log book. Al Solis

## 2021-10-19 DIAGNOSIS — E78.5 HYPERLIPIDEMIA LDL GOAL <70: ICD-10-CM

## 2021-10-19 RX ORDER — ATORVASTATIN CALCIUM 20 MG/1
20 TABLET, FILM COATED ORAL
Qty: 90 TABLET | Refills: 0 | Status: SHIPPED | OUTPATIENT
Start: 2021-10-19 | End: 2022-09-06 | Stop reason: SDUPTHER

## 2022-03-02 ENCOUNTER — TELEPHONE (OUTPATIENT)
Dept: PRIMARY CARE CLINIC | Age: 42
End: 2022-03-02

## 2022-03-02 NOTE — TELEPHONE ENCOUNTER
Per call from [place] with [facility], states pt is about to have surgery soon in that last OV/Labs/EKG is needed. No referral order on file and therefore I asked that a release request be sent for continuity of care.  She will fax over to us at [phone]

## 2022-03-08 ENCOUNTER — TELEPHONE (OUTPATIENT)
Dept: PRIMARY CARE CLINIC | Age: 42
End: 2022-03-08

## 2022-03-08 NOTE — TELEPHONE ENCOUNTER
[facility] calling in states they haven't received records they called about previously. I let her know that there's no release and or fax on letter head received by our office as not scanned to connect care. I provided our fax number in told her to fax over in order to release for continuity of care.

## 2022-03-10 ENCOUNTER — OFFICE VISIT (OUTPATIENT)
Dept: PRIMARY CARE CLINIC | Age: 42
End: 2022-03-10
Payer: COMMERCIAL

## 2022-03-10 VITALS
TEMPERATURE: 97.5 F | DIASTOLIC BLOOD PRESSURE: 76 MMHG | RESPIRATION RATE: 16 BRPM | HEIGHT: 62 IN | OXYGEN SATURATION: 95 % | BODY MASS INDEX: 35.15 KG/M2 | SYSTOLIC BLOOD PRESSURE: 116 MMHG | HEART RATE: 76 BPM | WEIGHT: 191 LBS

## 2022-03-10 DIAGNOSIS — Z01.818 PRE-OP EVALUATION: ICD-10-CM

## 2022-03-10 DIAGNOSIS — S46.011D TRAUMATIC INCOMPLETE TEAR OF RIGHT ROTATOR CUFF, SUBSEQUENT ENCOUNTER: Primary | ICD-10-CM

## 2022-03-10 DIAGNOSIS — E11.9 CONTROLLED TYPE 2 DIABETES MELLITUS WITHOUT COMPLICATION, WITHOUT LONG-TERM CURRENT USE OF INSULIN (HCC): ICD-10-CM

## 2022-03-10 DIAGNOSIS — J30.89 ENVIRONMENTAL AND SEASONAL ALLERGIES: ICD-10-CM

## 2022-03-10 DIAGNOSIS — I10 PRIMARY HYPERTENSION: ICD-10-CM

## 2022-03-10 DIAGNOSIS — E78.5 HYPERLIPIDEMIA, UNSPECIFIED HYPERLIPIDEMIA TYPE: ICD-10-CM

## 2022-03-10 DIAGNOSIS — K21.9 GASTROESOPHAGEAL REFLUX DISEASE WITHOUT ESOPHAGITIS: ICD-10-CM

## 2022-03-10 PROCEDURE — 93000 ELECTROCARDIOGRAM COMPLETE: CPT | Performed by: NURSE PRACTITIONER

## 2022-03-10 PROCEDURE — 99204 OFFICE O/P NEW MOD 45 MIN: CPT | Performed by: NURSE PRACTITIONER

## 2022-03-10 NOTE — PROGRESS NOTES
Chief Complaint   Patient presents with    Pre-op Exam     surgery on 03/15/22- pt states that she will only need lab work and a EKG- pt has no pre- op paper work       Health Maintenance Due   Topic    COVID-19 Vaccine (1)    Pneumococcal 0-64 years (1 of 2 - PPSV23)    Eye Exam Retinal or Dilated     DTaP/Tdap/Td series (1 - Tdap)    Flu Vaccine (1)    Cervical cancer screen         1. Have you been to the ER, urgent care clinic since your last visit? Hospitalized since your last visit? No    2. Have you seen or consulted any other health care providers outside of the [address] [place] [address] since your last visit? Include any pap smears or colon screening.  No    Visit Vitals  Wt 191 lb (86.6 kg)   BMI 34.93 kg/m²

## 2022-03-10 NOTE — PROGRESS NOTES
Preoperative Evaluation    Date of Exam: 3/10/2022    Maury Paredes is a 42 y.o. female (:1980) who presents for preoperative evaluation. Procedure/Surgery: Right shoulder Arthroscopy, rotator cuff repair, and debridement    Date of Procedure/Surgery: 3/15/22    Surgeon: Dr. Ayse Loredo with [facility] Facility: [facility]     Latex Allergy: no    Anesthesia Complications: Yes: Family Hx     History of abnormal bleeding : None    History of Blood Transfusions: no    Health Care Directive or Living Will: no      Allergies- reviewed: Allergies   Allergen Reactions    Nubain [Nalbuphine] Swelling     Of throat and tongue    Phenergan [Promethazine] Swelling     Caused swelling tongue and throat    Stadol [Butorphanol Tartrate] Swelling     Of tongue and throat       Medications- reviewed:   Current Outpatient Medications   Medication Sig    atorvastatin (LIPITOR) 20 mg tablet Take 1 Tablet by mouth nightly.  empagliflozin (Jardiance) 25 mg tablet Take 1 Tablet by mouth daily.  Blood-Glucose Meter (ONETOUCH ULTRA2) monitoring kit USE TO CHECK FASTING MORNING BLOOD SUGAR ONCE DAILY    glucose blood VI test strips (ONETOUCH ULTRA TEST) strip Check fasting morning blood sugar once/day    Lancets (ONETOUCH ULTRASOFT LANCETS) misc Check fasting morning blood sugar once/day.  fluticasone (FLONASE) 50 mcg/actuation nasal spray 2 Sprays by Both Nostrils route daily as needed. (Patient not taking: Reported on 2021)    lansoprazole (PREVACID) 30 mg capsule Take  by mouth Daily (before breakfast). (Patient not taking: Reported on 2021)    Lancets misc Check fasting sugar in the morning one time/day. (Patient not taking: Reported on 3/10/2022)     No current facility-administered medications for this visit.        Past Medical History- reviewed:  Past Medical History:   Diagnosis Date    Diabetes ([address]     Hyperlipidemia     TIA (transient ischemic attack) 6/7/2016       Past Surgical History- reviewed:   Past Surgical History:   Procedure Laterality Date    HX TUBAL LIGATION  2009    HX WISDOM TEETH EXTRACTION         Family History:  Family History   Problem Relation Age of Onset    Hypertension Mother     Elevated Lipids Mother     Neuropathy Mother     Headache Father     Hypertension Brother     Stroke Maternal Aunt     Hypertension Maternal Grandmother     Cancer Maternal Grandmother         throat cancer    Stroke Maternal Uncle         Social History- reviewed:  Social History     Socioeconomic History    Marital status:      Spouse name: Not on file    Number of children: Not on file    Years of education: Not on file    Highest education level: Not on file   Occupational History    Not on file   Tobacco Use    Smoking status: Never Smoker    Smokeless tobacco: Never Used   Vaping Use    Vaping Use: Never used   Substance and Sexual Activity    Alcohol use: Yes     Alcohol/week: 0.0 standard drinks     Comment: Occasional    Drug use: No    Sexual activity: Yes     Partners: Male     Birth control/protection: Surgical   Other Topics Concern    Not on file   Social History Narrative    Not on file     Social Determinants of Health     Financial Resource Strain:     Difficulty of Paying Living Expenses: Not on file   Food Insecurity:     Worried About Running Out of Food in the Last Year: Not on file    Luis Enrique of Food in the Last Year: Not on file   Transportation Needs:     Lack of Transportation (Medical): Not on file    Lack of Transportation (Non-Medical):  Not on file   Physical Activity:     Days of Exercise per Week: Not on file    Minutes of Exercise per Session: Not on file   Stress:     Feeling of Stress : Not on file   Social Connections:     Frequency of Communication with Friends and Family: Not on file    Frequency of Social Gatherings with Friends and Family: Not on file    Attends [employer] Services: Not on file    Active Member of Clubs or Organizations: Not on file    Attends Club or Organization Meetings: Not on file    Marital Status: Not on file   Intimate Partner Violence:     Fear of Current or Ex-Partner: Not on file    Emotionally Abused: Not on file    Physically Abused: Not on file    Sexually Abused: Not on file   Housing Stability:     Unable to Pay for Housing in the Last Year: Not on file    Number of [place] in the Last Year: Not on file    Unstable Housing in the Last Year: Not on file       Immunizations- reviewed: There is no immunization history on file for this patient. REVIEW OF SYSTEMS:  Review of Systems   Constitutional: Negative for activity change, appetite change, chills, diaphoresis, fatigue, fever and unexpected weight change. HENT: Negative for congestion, hearing loss, rhinorrhea and sore throat. Eyes: Negative for discharge. Respiratory: Negative for cough, chest tightness, shortness of breath and wheezing. Cardiovascular: Negative for chest pain and leg swelling. Gastrointestinal: Negative for abdominal pain, constipation and diarrhea. Genitourinary: Negative for dysuria, flank pain, frequency and urgency. Musculoskeletal: Negative for arthralgias, back pain and myalgias. Skin: Negative for color change and rash. Neurological: Negative for dizziness, syncope, weakness, light-headedness and headaches. Psychiatric/Behavioral: Negative for dysphoric mood and sleep disturbance. The patient is not nervous/anxious. Physical EXAM:   Visit Vitals  /76 (BP 1 Location: Left upper arm, BP Patient Position: Sitting, BP Cuff Size: Adult)   Pulse 76   Temp 97.5 °F (36.4 °C) (Temporal)   Resp 16   Ht 5' 2\" (1.575 m)   Wt 191 lb (86.6 kg)   LMP 02/14/2022 (Exact Date)   SpO2 95%   BMI 34.93 kg/m²       Physical Exam  Vitals and nursing note reviewed. Constitutional:       General: She is not in acute distress.      Appearance: Normal appearance. She is normal weight. She is not diaphoretic. HENT:      Right Ear: Tympanic membrane, ear canal and external ear normal.      Left Ear: Tympanic membrane, ear canal and external ear normal.      Mouth/Throat:      Mouth: Mucous membranes are moist.      Pharynx: Oropharynx is clear. Eyes:      General:         Right eye: No discharge. Left eye: No discharge. Extraocular Movements: Extraocular movements intact. Conjunctiva/sclera: Conjunctivae normal.      Pupils: Pupils are equal, round, and reactive to light. Neck:      Thyroid: No thyromegaly. Cardiovascular:      Rate and Rhythm: Normal rate and regular rhythm. Pulses: Normal pulses. Dorsalis pedis pulses are 2+ on the right side and 2+ on the left side. Heart sounds: Normal heart sounds. No murmur heard. Pulmonary:      Effort: Pulmonary effort is normal. No respiratory distress. Breath sounds: Normal breath sounds. No wheezing. Chest:      Chest wall: No tenderness. Abdominal:      General: Abdomen is flat. Bowel sounds are normal. There is no distension. Palpations: Abdomen is soft. Tenderness: There is no abdominal tenderness. Musculoskeletal:      Right lower leg: No edema. Left lower leg: No edema. Comments: B/L knees without crepitus   Lymphadenopathy:      Cervical: No cervical adenopathy. Skin:     Capillary Refill: Capillary refill takes less than 2 seconds. Neurological:      General: No focal deficit present. Mental Status: She is oriented to person, place, and time. Deep Tendon Reflexes:      Reflex Scores:       Patellar reflexes are 2+ on the right side and 2+ on the left side. Psychiatric:         Mood and Affect: Mood and affect normal.          DIAGNOSTICS:   1. EKG: NSR (HR 76 bpm)  2. Labs: CBC, CMP, and HA1C ordered today. Once results, will send to [facility].        IMPRESSION:   Below is the assessment and plan developed based on review of pertinent history, physical exam, labs, studies, and medications. 1. Traumatic incomplete tear of right rotator cuff, subsequent encounter  Educated patient on the importance of stopping NSAIDs 5 days prior to surgery. Use Tylenol prn for pain. 2. Pre-op evaluation  Cleared for surgery pending results of labs. -     AMB POC EKG ROUTINE W/ 12 LEADS, INTER & REP  -     CBC WITH AUTOMATED DIFF; Future  -     METABOLIC PANEL, COMPREHENSIVE; Future  -     HEMOGLOBIN A1C WITH EAG; Future  3. Primary hypertension  Well managed without the use of BP meds. 4. Controlled type 2 diabetes mellitus without complication, without long-term current use of insulin ([facility] [address]  Discussed with patient to take last dose of Jardiance on the morning of 3/14. Per patient, her at home Accucheck are around 100's. -     METABOLIC PANEL, COMPREHENSIVE; Future  -     HEMOGLOBIN A1C WITH EAG; Future  5. Hyperlipidemia, unspecified hyperlipidemia type  Please take last dose of Lipitor on night of 3/13. 6. Gastroesophageal reflux disease without esophagitis  Please abstain from using Prevacid prn prior to surgery. If heartburn symptoms develop, please take TUMS. 7. Environmental and seasonal allergies  Can take Flonase the morning of surgery.      Kim Cruz NP    [facility]   [address]  P: [phone]  F: [phone]

## 2022-03-14 NOTE — TELEPHONE ENCOUNTER
recd' fax request from [facility] for records, I faxed last OV notes/labs/egk to Att: Pipe London at [phone]

## 2022-03-14 NOTE — TELEPHONE ENCOUNTER
[facility] calling again for records for third time. No release and or fax request on file to send for continuity of care. Asked that she fax this morning to my att:Samuel(PSR) in that I would be sure to send. I let the person know that's handling faxes today to be on look out for this.

## 2022-03-19 PROBLEM — E11.9 TYPE 2 DIABETES MELLITUS WITHOUT COMPLICATION, WITHOUT LONG-TERM CURRENT USE OF INSULIN (HCC): Status: ACTIVE | Noted: 2018-11-21

## 2022-03-20 PROBLEM — K21.9 GASTROESOPHAGEAL REFLUX DISEASE WITHOUT ESOPHAGITIS: Status: ACTIVE | Noted: 2018-11-22

## 2022-05-06 ENCOUNTER — OFFICE VISIT (OUTPATIENT)
Dept: FAMILY MEDICINE CLINIC | Age: 42
End: 2022-05-06
Payer: COMMERCIAL

## 2022-05-06 VITALS
SYSTOLIC BLOOD PRESSURE: 131 MMHG | WEIGHT: 192 LBS | OXYGEN SATURATION: 95 % | TEMPERATURE: 98 F | HEART RATE: 72 BPM | RESPIRATION RATE: 18 BRPM | BODY MASS INDEX: 35.33 KG/M2 | HEIGHT: 62 IN | DIASTOLIC BLOOD PRESSURE: 77 MMHG

## 2022-05-06 DIAGNOSIS — H81.11 BENIGN PAROXYSMAL POSITIONAL VERTIGO OF RIGHT EAR: Primary | ICD-10-CM

## 2022-05-06 DIAGNOSIS — Z76.89 ENCOUNTER TO ESTABLISH CARE: ICD-10-CM

## 2022-05-06 PROCEDURE — 99203 OFFICE O/P NEW LOW 30 MIN: CPT | Performed by: STUDENT IN AN ORGANIZED HEALTH CARE EDUCATION/TRAINING PROGRAM

## 2022-05-06 RX ORDER — MELOXICAM 15 MG/1
TABLET ORAL
COMMUNITY
Start: 2022-04-25

## 2022-05-06 RX ORDER — MECLIZINE HYDROCHLORIDE 25 MG/1
TABLET ORAL
COMMUNITY
Start: 2022-05-03 | End: 2022-09-06

## 2022-05-06 RX ORDER — PREDNISONE 20 MG/1
TABLET ORAL
COMMUNITY
Start: 2022-05-03 | End: 2022-09-06

## 2022-05-06 NOTE — PATIENT INSTRUCTIONS
Benign Paroxysmal Positional Vertigo (BPPV): Care Instructions  Your Care Instructions     Benign paroxysmal positional vertigo, also called BPPV, is an inner ear problem. It causes a spinning or whirling sensation when you move your head. This sensation is called vertigo. The vertigo usually lasts for less than a minute. People often have vertigo spells for a few days or weeks. Then the vertigo goes away. But it may come back again. The vertigo may be mild, or it may be bad enough to cause unsteadiness, nausea, and vomiting. When you move, your inner ear sends messages to the brain. This helps you keep your balance. Vertigo can happen when debris builds up in the inner ear. The buildup can cause the inner ear to send the wrong message to the brain. Your doctor may move you in different positions to help your vertigo get better faster. This is called the Epley maneuver. Your doctor may also prescribe medicines or exercises to help with your symptoms. Follow-up care is a key part of your treatment and safety. Be sure to make and go to all appointments, and call your doctor if you are having problems. It's also a good idea to know your test results and keep a list of the medicines you take. How can you care for yourself at home? · If your doctor suggests that you do Ann-Daroff exercises:  ? Sit on the edge of a bed or sofa. Quickly lie down on the side that causes the worst vertigo. Lie on your side with your ear down. ? Stay in this position for at least 30 seconds or until the vertigo goes away. ? Sit up. If this causes vertigo, wait for it to stop. ? Repeat the procedure on the other side. ? Repeat this 10 times. Do these exercises 2 times a day until the vertigo is gone. When should you call for help? Call 911 anytime you think you may need emergency care. For example, call if:    · You have symptoms of a stroke.  These may include:  ? Sudden numbness, tingling, weakness, or loss of movement in your face, arm, or leg, especially on only one side of your body. ? Sudden vision changes. ? Sudden trouble speaking. ? Sudden confusion or trouble understanding simple statements. ? Sudden problems with walking or balance. ? A sudden, severe headache that is different from past headaches. Call your doctor now or seek immediate medical care if:    · You have new or worse nausea and vomiting.     · You have new symptoms such as hearing loss or roaring in your ears. Watch closely for changes in your health, and be sure to contact your doctor if:    · You are not getting better as expected.     · Your vertigo gets worse. Where can you learn more? Go to http://www.gray.com/  Enter P372 in the search box to learn more about \"Benign Paroxysmal Positional Vertigo (BPPV): Care Instructions. \"  Current as of: September 8, 2021               Content Version: 13.2  © 6298-3978 [employer]. Care instructions adapted under license by [employer] (which disclaims liability or warranty for this information). If you have questions about a medical condition or this instruction, always ask your healthcare professional. [address] any warranty or liability for your use of this information.

## 2022-05-06 NOTE — PROGRESS NOTES
Identified Patient with two Patient identifiers (Name and ). Two Patient Identifiers confirmed. Reviewed record in preparation for visit and have obtained necessary documentation. Chief Complaint   Patient presents with    Dizziness     Patient was seen Tuesday morning at [facility] for sudden onset of vertigo in the middle of the night. Patient state EKG was normal at the hospital. They told her to follow up with ENT and PCP. Visit Vitals  /77 (BP 1 Location: Right arm, BP Patient Position: Sitting, BP Cuff Size: Large adult)   Pulse 72   Temp 98 °F (36.7 °C) (Temporal)   Resp 18   Ht 5' 2\" (1.575 m)   Wt 192 lb (87.1 kg)   SpO2 95%   BMI 35.12 kg/m²       1. Have you been to the ER, urgent care clinic since your last visit? Hospitalized since your last visit? No    2. Have you seen or consulted any other health care providers outside of the [address] [place] [address] since your last visit? Include any pap smears or colon screening.  No

## 2022-05-06 NOTE — PROGRESS NOTES
Georgia Whitehead  42 y.o. female  1980  UV  [facility]  Progress Note     Encounter Date: 2022    Assessment and Plan:     Encounter Diagnoses     ICD-10-CM ICD-9-CM   1. Benign paroxysmal positional vertigo of right ear  H81.11 386.11   2. Encounter to establish care  Z76.89 V65.8       1. Encounter to establish care  -PMH, allergies, meds added to the chart  - Follow up for well man    2. Benign paroxysmal positional vertigo of right ear  - Positive edmundo bhakta pike noted on exam. Recent work up in ED that showed no other acute findings  - Will refer to vestibular PT for further therapy. Patient encouraged to also follow up with ENT   - REFERRAL TO PHYSICAL THERAPY      I have discussed the diagnosis with the patient and the intended plan as seen in the above orders. she has expressed understanding. The patient has received an after-visit summary and questions were answered concerning future plans. I have discussed medication side effects and warnings with the patient as well. Electronically Signed: Conchita Quigley, DO    Current Medications after this visit     Current Outpatient Medications   Medication Sig    meclizine (ANTIVERT) 25 mg tablet TAKE 1 TABLET BY MOUTH THREE TIMES DAILY AS NEEDED FOR DIZZINESS    predniSONE (DELTASONE) 20 mg tablet TAKE 3 TABLETS BY MOUTH DAILY    meloxicam (MOBIC) 15 mg tablet     atorvastatin (LIPITOR) 20 mg tablet Take 1 Tablet by mouth nightly.  empagliflozin (Jardiance) 25 mg tablet Take 1 Tablet by mouth daily.  Blood-Glucose Meter (ONETOUCH ULTRA2) monitoring kit USE TO CHECK FASTING MORNING BLOOD SUGAR ONCE DAILY    fluticasone (FLONASE) 50 mcg/actuation nasal spray 2 Sprays by Both Nostrils route daily as needed.  lansoprazole (PREVACID) 30 mg capsule Take  by mouth Daily (before breakfast).     glucose blood VI test strips (ONETOUCH ULTRA TEST) strip Check fasting morning blood sugar once/day    Lancets misc Check fasting sugar in the morning one time/day.  Lancets (ONETOUCH ULTRASOFT LANCETS) [facility] Check fasting morning blood sugar once/day. No current facility-administered medications for this visit. There are no discontinued medications. ~~~~~~~~~~~~~~~~~~~~~~~~~~~~~~~~~~~~~~~~~~~~~~~~~~~~~~~~~~~    Chief Complaint   Patient presents with    Dizziness     Patient was seen Tuesday morning at [facility] for sudden onset of vertigo in the middle of the night. Patient state EKG was normal at the hospital. They told her to follow up with ENT and PCP. History provided by patient  History of Present Illness   Elaine Scherer is a 42 y.o. female who presents to clinic today for:  Dizziness (Patient was seen Tuesday morning at [facility] for sudden onset of vertigo in the middle of the night. Patient state EKG was normal at the hospital. They told her to follow up with ENT and PCP.)      Patient presents to [facility] care. Formerly followed by Dr. Hina Pace. PM, Allergies meds added to chart    Patient recently seen in the ER at [facility] doctors for dizziness described as the room is spinning. Testing was done that showed nystagmus with positional changes. Was diagnosed BPPV and was discharged with prednisone and meclizine. Was told to follow up with ENT and has a visit scheduled on 5/11. Is still symptomatic. The dizziness is described as spinning and is worse with certain positions only on the R side. She was told not to drive and has not been. Would like to know if any other therapy would be indicated. Health Maintenance    Health Maintenance Due   Topic Date Due    COVID-19 Vaccine (1) Never done    Pneumococcal 0-64 years (1 - PCV) Never done    Eye Exam Retinal or Dilated  Never done    DTaP/Tdap/Td series (1 - Tdap) Never done    Cervical cancer screen  09/23/2021     Review of Systems   Review of Systems   Constitutional: Negative for chills and fever. HENT: Negative for ear discharge and ear pain. Respiratory: Negative for cough and shortness of breath. Cardiovascular: Negative for chest pain and palpitations. Gastrointestinal: Negative for abdominal pain, nausea and vomiting. Neurological: Positive for dizziness. Negative for sensory change, speech change and focal weakness. Vitals/Objective:     Vitals:    05/06/22 1349   BP: 131/77   Pulse: 72   Resp: 18   Temp: 98 °F (36.7 °C)   TempSrc: Temporal   SpO2: 95%   Weight: 192 lb (87.1 kg)   Height: 5' 2\" (1.575 m)     Body mass index is 35.12 kg/m². Wt Readings from Last 3 Encounters:   05/06/22 192 lb (87.1 kg)   03/10/22 191 lb (86.6 kg)   10/18/21 189 lb 3.2 oz (85.8 kg)         Objective  Physical Exam  Constitutional:       General: She is not in acute distress. HENT:      Head: Normocephalic and atraumatic. Comments: Positive edmundo bhakta pike on R side  Eyes:      Extraocular Movements:      Right eye: Nystagmus present. Normal extraocular motion. Left eye: Normal extraocular motion and no nystagmus. Cardiovascular:      Rate and Rhythm: Normal rate. No results found for this or any previous visit (from the past 24 hour(s)). Disposition     Follow-up and Dispositions  ·   Return for well woman at earliest convenience. No future appointments. History   Patient's past medical, surgical and family histories were reviewed and updated.     Past Medical History:   Diagnosis Date    Diabetes ([address]     Hyperlipidemia     TIA (transient ischemic attack) 6/7/2016    Vertigo      Past Surgical History:   Procedure Laterality Date    HX ROTATOR CUFF REPAIR  03/15/2022    R side    HX TUBAL LIGATION  2009    HX WISDOM TEETH EXTRACTION       Family History   Problem Relation Age of Onset    Hypertension Mother     Elevated Lipids Mother     Neuropathy Mother     Headache Father     Hypertension Brother     Stroke Maternal Aunt     Hypertension Maternal Grandmother     Cancer Maternal Grandmother         throat cancer    Stroke Maternal Uncle      Social History     Tobacco Use    Smoking status: Never Smoker    Smokeless tobacco: Never Used   Vaping Use    Vaping Use: Never used   Substance Use Topics    Alcohol use:  Yes     Alcohol/week: 0.0 standard drinks     Comment: Occasional    Drug use: No       Allergies     Allergies   Allergen Reactions    Nubain [Nalbuphine] Swelling     Of throat and tongue    Phenergan [Promethazine] Swelling     Caused swelling tongue and throat    Stadol [Butorphanol Tartrate] Swelling     Of tongue and throat

## 2022-09-01 DIAGNOSIS — E11.9 TYPE 2 DIABETES MELLITUS WITHOUT COMPLICATION, WITHOUT LONG-TERM CURRENT USE OF INSULIN (HCC): ICD-10-CM

## 2022-09-01 NOTE — TELEPHONE ENCOUNTER
PCP: Magdalena Triana MD    Last appt: 8/16/2022  Future Appointments   Date Time Provider Carolina Gutierres   9/6/2022  2:00 PM Anh Mccormack DO [facility]       Requested Prescriptions     Pending Prescriptions Disp Refills    empagliflozin (Jardiance) 25 mg tablet 30 Tablet 0     Sig: Take 1 Tablet by mouth daily.          Other Comments:

## 2022-09-06 ENCOUNTER — OFFICE VISIT (OUTPATIENT)
Dept: PRIMARY CARE CLINIC | Age: 42
End: 2022-09-06
Payer: COMMERCIAL

## 2022-09-06 VITALS
OXYGEN SATURATION: 99 % | RESPIRATION RATE: 16 BRPM | HEIGHT: 62 IN | DIASTOLIC BLOOD PRESSURE: 82 MMHG | SYSTOLIC BLOOD PRESSURE: 121 MMHG | HEART RATE: 78 BPM | TEMPERATURE: 98.4 F | WEIGHT: 196.6 LBS | BODY MASS INDEX: 36.18 KG/M2

## 2022-09-06 DIAGNOSIS — E11.9 TYPE 2 DIABETES MELLITUS WITHOUT COMPLICATION, WITHOUT LONG-TERM CURRENT USE OF INSULIN (HCC): ICD-10-CM

## 2022-09-06 DIAGNOSIS — J30.89 OTHER ALLERGIC RHINITIS: ICD-10-CM

## 2022-09-06 DIAGNOSIS — E78.5 HYPERLIPIDEMIA LDL GOAL <70: ICD-10-CM

## 2022-09-06 PROCEDURE — 3044F HG A1C LEVEL LT 7.0%: CPT | Performed by: FAMILY MEDICINE

## 2022-09-06 PROCEDURE — 99214 OFFICE O/P EST MOD 30 MIN: CPT | Performed by: FAMILY MEDICINE

## 2022-09-06 RX ORDER — FLUTICASONE PROPIONATE 50 MCG
2 SPRAY, SUSPENSION (ML) NASAL
Qty: 1 EACH | Refills: 5 | Status: SHIPPED | OUTPATIENT
Start: 2022-09-06

## 2022-09-06 RX ORDER — LANSOPRAZOLE 30 MG/1
30 CAPSULE, DELAYED RELEASE ORAL
Qty: 90 CAPSULE | Refills: 1 | Status: SHIPPED | OUTPATIENT
Start: 2022-09-06

## 2022-09-06 RX ORDER — ATORVASTATIN CALCIUM 20 MG/1
20 TABLET, FILM COATED ORAL
Qty: 90 TABLET | Refills: 1 | Status: SHIPPED | OUTPATIENT
Start: 2022-09-06

## 2022-09-06 RX ORDER — INSULIN PUMP SYRINGE, 3 ML
EACH MISCELLANEOUS
Qty: 1 KIT | Refills: 0 | Status: SHIPPED | OUTPATIENT
Start: 2022-09-06
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
## 2022-09-06 NOTE — PROGRESS NOTES
Chief Complaint   Patient presents with    [facility] Care     3 most recent PHQ Screens 9/6/2022   Little interest or pleasure in doing things Not at all   Feeling down, depressed, irritable, or hopeless Not at all   Total Score PHQ 2 0   Trouble falling or staying asleep, or sleeping too much -   Feeling tired or having little energy -   Poor appetite, weight loss, or overeating -   Feeling bad about yourself - or that you are a failure or have let yourself or your family down -   Trouble concentrating on things such as school, work, reading, or watching TV -   Moving or speaking so slowly that other people could have noticed; or the opposite being so fidgety that others notice -   Thoughts of being better off dead, or hurting yourself in some way -   PHQ 9 Score -   How difficult have these problems made it for you to do your work, take care of your home and get along with others -     Abuse Screening Questionnaire 9/6/2022   Do you ever feel afraid of your partner? N   Are you in a relationship with someone who physically or mentally threatens you? N   Is it safe for you to go home?  Y     Visit Vitals  /82 (BP 1 Location: Left upper arm, BP Patient Position: Sitting, BP Cuff Size: Large adult)   Pulse 78   Temp 98.4 °F (36.9 °C) (Temporal)   Resp 16   Ht 5' 2\" (1.575 m)   Wt 196 lb 9.6 oz (89.2 kg)   SpO2 99%   BMI 35.96 kg/m²

## 2022-09-12 ENCOUNTER — TELEPHONE (OUTPATIENT)
Dept: PRIMARY CARE CLINIC | Age: 42
End: 2022-09-12

## 2022-09-12 NOTE — TELEPHONE ENCOUNTER
Spoke to patient let her know prior Herb Davey was approved for Jardiance. Called pharmacy and they reprocessed Jardiance and it went through.

## 2022-10-18 DIAGNOSIS — E11.9 TYPE 2 DIABETES MELLITUS WITHOUT COMPLICATION, WITHOUT LONG-TERM CURRENT USE OF INSULIN (HCC): ICD-10-CM

## 2023-01-04 DIAGNOSIS — E11.9 TYPE 2 DIABETES MELLITUS WITHOUT COMPLICATION, WITHOUT LONG-TERM CURRENT USE OF INSULIN (HCC): ICD-10-CM

## 2023-07-10 ENCOUNTER — NURSE TRIAGE (OUTPATIENT)
Dept: OTHER | Facility: CLINIC | Age: 43
End: 2023-07-10

## 2023-07-10 NOTE — TELEPHONE ENCOUNTER
Location of patient: [address] [facility] [address] call from Bandy at [facility] with [employer]. Subjective: Caller states \"back pain and butt pain and hip\"     Current Symptoms: lower back pain that radiates into the buttock and left hip. Pt reports new numbness in the right groin     Onset: a few days ago; worsening    Associated Symptoms: reduced activity, increased wakefulness    Pain Severity: 8/10; sharp; intermittent    Temperature: no fevers     What has been tried: diclofenac     LMP:  7/4  Pregnant: No    Recommended disposition: Go to ED Now    Care advice provided, patient verbalizes understanding; denies any other questions or concerns; instructed to call back for any new or worsening symptoms. Patient/caller agrees to proceed to the Emergency Department    Attention Provider: Thank you for allowing me to participate in the care of your patient. The patient was connected to triage in response to information provided to the ECC/PSC. Please do not respond through this encounter as the response is not directed to a shared pool.       Reason for Disposition   Numbness (loss of sensation) in groin or rectal area    Protocols used: Back Pain-ADULT-[place]

## 2023-07-12 ENCOUNTER — TELEPHONE (OUTPATIENT)
Dept: PRIMARY CARE CLINIC | Facility: CLINIC | Age: 43
End: 2023-07-12

## 2023-07-12 NOTE — TELEPHONE ENCOUNTER
Patient said she was seen in urgent care 7/6/23 for lower left buttock, Sciatica pain. I scheduled the patient an appointment on 7/21/23 but the patient said she would like to be seen sooner. Patient said urgent care only gave her 6 pills for the pain. Patient asked if someone can call her back.

## 2023-07-13 NOTE — TELEPHONE ENCOUNTER
Called pt. Name and  verified. Let pt know the earliest appt [place] has is the . Pt stated she will keep what she currently has.

## 2023-07-21 ENCOUNTER — HOSPITAL ENCOUNTER (OUTPATIENT)
Facility: HOSPITAL | Age: 43
Discharge: HOME | End: 2023-07-21
Attending: FAMILY MEDICINE
Payer: COMMERCIAL

## 2023-07-21 ENCOUNTER — OFFICE VISIT (OUTPATIENT)
Dept: PRIMARY CARE CLINIC | Facility: CLINIC | Age: 43
End: 2023-07-21
Payer: COMMERCIAL

## 2023-07-21 VITALS
SYSTOLIC BLOOD PRESSURE: 117 MMHG | HEART RATE: 72 BPM | HEIGHT: 62 IN | BODY MASS INDEX: 34.71 KG/M2 | OXYGEN SATURATION: 98 % | TEMPERATURE: 98.1 F | DIASTOLIC BLOOD PRESSURE: 77 MMHG | WEIGHT: 188.6 LBS | RESPIRATION RATE: 18 BRPM

## 2023-07-21 DIAGNOSIS — I10 ESSENTIAL (PRIMARY) HYPERTENSION: ICD-10-CM

## 2023-07-21 DIAGNOSIS — M54.32 LEFT SIDED SCIATICA: ICD-10-CM

## 2023-07-21 DIAGNOSIS — M54.32 LEFT SIDED SCIATICA: Primary | ICD-10-CM

## 2023-07-21 DIAGNOSIS — E11.9 TYPE 2 DIABETES MELLITUS WITHOUT COMPLICATION, WITHOUT LONG-TERM CURRENT USE OF INSULIN (HCC): ICD-10-CM

## 2023-07-21 DIAGNOSIS — E78.5 HYPERLIPIDEMIA LDL GOAL <70: ICD-10-CM

## 2023-07-21 PROCEDURE — 72100 X-RAY EXAM L-S SPINE 2/3 VWS: CPT

## 2023-07-21 PROCEDURE — 3074F SYST BP LT 130 MM HG: CPT | Performed by: FAMILY MEDICINE

## 2023-07-21 PROCEDURE — 3078F DIAST BP <80 MM HG: CPT | Performed by: FAMILY MEDICINE

## 2023-07-21 PROCEDURE — 99214 OFFICE O/P EST MOD 30 MIN: CPT | Performed by: FAMILY MEDICINE

## 2023-07-21 PROCEDURE — 72114 X-RAY EXAM L-S SPINE BENDING: CPT

## 2023-07-21 RX ORDER — ATORVASTATIN CALCIUM 20 MG/1
20 TABLET, FILM COATED ORAL NIGHTLY
Qty: 90 TABLET | Refills: 3 | Status: SHIPPED | OUTPATIENT
Start: 2023-07-21

## 2023-07-21 NOTE — PROGRESS NOTES
1. \"Have you been to the ER, urgent care clinic since your last visit? Hospitalized since your last visit? \" Yes Where: [facility]    2. \"Have you seen or consulted any other health care providers outside of the [address] [place] [address] since your last visit? \" Yes Where: [facility]      3. For patients aged 43-73: Has the patient had a colonoscopy / FIT/ Cologuard? NA - based on age      If the patient is female:    4. For patients aged 43-66: Has the patient had a mammogram within the past 2 years? Yes - Care Gap present. Rooming MA/LPN to request most recent results      5. For patients aged 21-65: Has the patient had a pap smear? Yes - Care Gap present.  Rooming MA/LPN to request most recent results

## 2023-07-21 NOTE — PROGRESS NOTES
Subjective  Arpan Padilla is an 36 y.o. female who presents for follow up. Hx DM2, HLD, TIA, GERD, Anxiety. DM2, last ov restarted meds 9/2022. She is taking jardiance and tolerating this. She did not tolerate metformin due to GI symptoms. Diet is healthy, pescitarian. She is walking for exercise 3 days per week. Recently having left sided sciatica and low back pain. Urgent care visit and prescribed nsaid and muscle relaxant. She reports she did not have xray. No urinary symptoms. No fevers. No chest pain or dyspnea. Allergies - reviewed: Allergies   Allergen Reactions    Butorphanol Swelling     Of tongue and throat    Nalbuphine Swelling     Of throat and tongue    Promethazine Swelling     Caused swelling tongue and throat         Medications - reviewed:   Current Outpatient Medications   Medication Sig    atorvastatin (LIPITOR) 20 MG tablet Take 1 tablet by mouth nightly    empagliflozin (JARDIANCE) 25 MG tablet Take 25 mg by mouth daily    fluticasone (FLONASE) 50 MCG/ACT nasal spray 2 sprays by Nasal route daily as needed    lansoprazole (PREVACID) 30 MG delayed release capsule Take 30 mg by mouth every morning (before breakfast)    meloxicam (MOBIC) 15 MG tablet ceived the following from [facility]: Outside name: meloxicam (MOBIC) 15 mg tablet     No current facility-administered medications for this visit.          Past Medical History - reviewed:  Past Medical History:   Diagnosis Date    Diabetes ([address])     Hyperlipidemia     TIA (transient ischemic attack) 6/7/2016    Vertigo          Past Surgical History - reviewed:   Past Surgical History:   Procedure Laterality Date    ROTATOR CUFF REPAIR  03/15/2022    R side    TUBAL LIGATION  2009    WISDOM TOOTH EXTRACTION           Social History - reviewed:  Social History     Socioeconomic History    Marital status:      Spouse name: Not on file    Number of children: Not on file    Years of education: Not

## 2023-07-22 LAB
ALBUMIN SERPL-MCNC: 4.2 G/DL (ref 3.5–5)
ALBUMIN/GLOB SERPL: 1.2 (ref 1.1–2.2)
ALP SERPL-CCNC: 70 U/L (ref 45–117)
ALT SERPL-CCNC: 28 U/L (ref 12–78)
ANION GAP SERPL CALC-SCNC: 4 MMOL/L (ref 5–15)
AST SERPL-CCNC: 16 U/L (ref 15–37)
BILIRUB SERPL-MCNC: 0.6 MG/DL (ref 0.2–1)
BUN SERPL-MCNC: 10 MG/DL (ref 6–20)
BUN/CREAT SERPL: 12 (ref 12–20)
CALCIUM SERPL-MCNC: 8.8 MG/DL (ref 8.5–10.1)
CHLORIDE SERPL-SCNC: 107 MMOL/L (ref 97–108)
CO2 SERPL-SCNC: 28 MMOL/L (ref 21–32)
CREAT SERPL-MCNC: 0.81 MG/DL (ref 0.55–1.02)
CREAT UR-MCNC: 103 MG/DL
EST. AVERAGE GLUCOSE BLD GHB EST-MCNC: 154 MG/DL
GLOBULIN SER CALC-MCNC: 3.6 G/DL (ref 2–4)
GLUCOSE SERPL-MCNC: 115 MG/DL (ref 65–100)
HBA1C MFR BLD: 7 % (ref 4–5.6)
MICROALBUMIN UR-MCNC: 1.04 MG/DL
MICROALBUMIN/CREAT UR-RTO: 10 MG/G (ref 0–30)
POTASSIUM SERPL-SCNC: 3.8 MMOL/L (ref 3.5–5.1)
PROT SERPL-MCNC: 7.8 G/DL (ref 6.4–8.2)
SODIUM SERPL-SCNC: 139 MMOL/L (ref 136–145)

## 2024-02-13 RX ORDER — EMPAGLIFLOZIN 25 MG/1
25 TABLET, FILM COATED ORAL DAILY
Qty: 30 TABLET | Refills: 0 | Status: SHIPPED | OUTPATIENT
Start: 2024-02-13

## 2024-05-09 ENCOUNTER — OFFICE VISIT (OUTPATIENT)
Dept: PRIMARY CARE CLINIC | Facility: CLINIC | Age: 44
End: 2024-05-09
Payer: COMMERCIAL

## 2024-05-09 VITALS
WEIGHT: 186 LBS | BODY MASS INDEX: 34.23 KG/M2 | TEMPERATURE: 97.3 F | HEART RATE: 68 BPM | DIASTOLIC BLOOD PRESSURE: 80 MMHG | SYSTOLIC BLOOD PRESSURE: 120 MMHG | RESPIRATION RATE: 12 BRPM | HEIGHT: 62 IN | OXYGEN SATURATION: 99 %

## 2024-05-09 DIAGNOSIS — Z12.11 SCREEN FOR COLON CANCER: ICD-10-CM

## 2024-05-09 DIAGNOSIS — E11.9 TYPE 2 DIABETES MELLITUS WITHOUT COMPLICATION, WITHOUT LONG-TERM CURRENT USE OF INSULIN (HCC): Primary | ICD-10-CM

## 2024-05-09 DIAGNOSIS — M79.671 RIGHT FOOT PAIN: ICD-10-CM

## 2024-05-09 DIAGNOSIS — Z00.01 ENCOUNTER FOR WELL ADULT EXAM WITH ABNORMAL FINDINGS: ICD-10-CM

## 2024-05-09 DIAGNOSIS — E11.9 TYPE 2 DIABETES MELLITUS WITHOUT COMPLICATION, WITHOUT LONG-TERM CURRENT USE OF INSULIN (HCC): ICD-10-CM

## 2024-05-09 PROBLEM — Z86.73 HISTORY OF STROKE: Status: ACTIVE | Noted: 2024-05-09

## 2024-05-09 PROCEDURE — 99396 PREV VISIT EST AGE 40-64: CPT | Performed by: FAMILY MEDICINE

## 2024-05-09 SDOH — ECONOMIC STABILITY: FOOD INSECURITY: WITHIN THE PAST 12 MONTHS, YOU WORRIED THAT YOUR FOOD WOULD RUN OUT BEFORE YOU GOT MONEY TO BUY MORE.: NEVER TRUE

## 2024-05-09 SDOH — ECONOMIC STABILITY: FOOD INSECURITY: WITHIN THE PAST 12 MONTHS, THE FOOD YOU BOUGHT JUST DIDN'T LAST AND YOU DIDN'T HAVE MONEY TO GET MORE.: NEVER TRUE

## 2024-05-09 SDOH — ECONOMIC STABILITY: HOUSING INSECURITY
IN THE LAST 12 MONTHS, WAS THERE A TIME WHEN YOU DID NOT HAVE A STEADY PLACE TO SLEEP OR SLEPT IN A SHELTER (INCLUDING NOW)?: NO

## 2024-05-09 SDOH — ECONOMIC STABILITY: INCOME INSECURITY: HOW HARD IS IT FOR YOU TO PAY FOR THE VERY BASICS LIKE FOOD, HOUSING, MEDICAL CARE, AND HEATING?: NOT HARD AT ALL

## 2024-05-09 ASSESSMENT — PATIENT HEALTH QUESTIONNAIRE - PHQ9
SUM OF ALL RESPONSES TO PHQ QUESTIONS 1-9: 0
2. FEELING DOWN, DEPRESSED OR HOPELESS: NOT AT ALL
SUM OF ALL RESPONSES TO PHQ QUESTIONS 1-9: 0
SUM OF ALL RESPONSES TO PHQ9 QUESTIONS 1 & 2: 0
SUM OF ALL RESPONSES TO PHQ QUESTIONS 1-9: 0
SUM OF ALL RESPONSES TO PHQ QUESTIONS 1-9: 0
1. LITTLE INTEREST OR PLEASURE IN DOING THINGS: NOT AT ALL

## 2024-05-09 NOTE — PROGRESS NOTES
\"Have you been to the ER, urgent care clinic since your last visit?  Hospitalized since your last visit?\"    NO    “Have you seen or consulted any other health care providers outside of [facility] since your last visit?”    NO     “Have you had a pap smear?”    Date of last Cervical Cancer screen (HPV or PAP): 9/23/2016 09/2023         Click Here for Release of Records Request   
 Alcohol/week: 0.0 standard drinks of alcohol    Drug use: No    Sexual activity: Not on file   Other Topics Concern    Not on file   Social History Narrative    Not on file     Social Determinants of Health     Financial Resource Strain: Low Risk  (5/9/2024)    Overall Financial Resource Strain (CARDIA)     Difficulty of Paying Living Expenses: Not hard at all   Food Insecurity: No Food Insecurity (5/9/2024)    Hunger Vital Sign     Worried About Running Out of Food in the Last Year: Never true     Ran Out of Food in the Last Year: Never true   Transportation Needs: Unknown (5/9/2024)    PRAPARE - Transportation     Lack of Transportation (Medical): Not on file     Lack of Transportation (Non-Medical): No   Physical Activity: Not on file   Stress: Not on file   Social Connections: Not on file   Intimate Partner Violence: Not on file   Housing Stability: Unknown (5/9/2024)    Housing Stability Vital Sign     Unable to Pay for Housing in the Last Year: Not on file     Number of Places Lived in the Last Year: Not on file     Unstable Housing in the Last Year: No         Family History - reviewed:  Family History   Problem Relation Age of Onset    Cancer Maternal Grandmother         throat cancer    Hypertension Maternal Grandmother     Stroke Maternal Aunt     Hypertension Brother     Stroke Maternal Uncle     Neuropathy Mother     Hypertension Mother     Elevated Lipids Mother     Headache Father          Immunizations - reviewed:   There is no immunization history for the selected administration types on file for this patient.      ROS  CONSTITUTIONAL: Denies fever, chills, unintentional weight loss.  CARDIOVASCULAR: Denies chest pain, orthopnea, PND.  RESPIRATORY: Denies dyspnea, wheezing, hemoptysis.  GI: Denies abdominal pain, diarrhea, constipation, black or bloody stool.         Physical Exam  /80 (Site: Left Upper Arm, Position: Sitting, Cuff Size: Large Adult)   Pulse 68   Temp 97.3 °F (36.3 °C)

## 2024-05-10 LAB
ALBUMIN SERPL-MCNC: 4.1 G/DL (ref 3.5–5)
ALBUMIN/GLOB SERPL: 1.4 (ref 1.1–2.2)
ALP SERPL-CCNC: 64 U/L (ref 45–117)
ALT SERPL-CCNC: 20 U/L (ref 12–78)
ANION GAP SERPL CALC-SCNC: 5 MMOL/L (ref 5–15)
AST SERPL-CCNC: 14 U/L (ref 15–37)
BILIRUB SERPL-MCNC: 0.7 MG/DL (ref 0.2–1)
BUN SERPL-MCNC: 8 MG/DL (ref 6–20)
BUN/CREAT SERPL: 12 (ref 12–20)
CALCIUM SERPL-MCNC: 9.2 MG/DL (ref 8.5–10.1)
CHLORIDE SERPL-SCNC: 106 MMOL/L (ref 97–108)
CHOLEST SERPL-MCNC: 184 MG/DL
CO2 SERPL-SCNC: 28 MMOL/L (ref 21–32)
CREAT SERPL-MCNC: 0.65 MG/DL (ref 0.55–1.02)
EST. AVERAGE GLUCOSE BLD GHB EST-MCNC: 140 MG/DL
GLOBULIN SER CALC-MCNC: 3 G/DL (ref 2–4)
GLUCOSE SERPL-MCNC: 103 MG/DL (ref 65–100)
HBA1C MFR BLD: 6.5 % (ref 4–5.6)
HDLC SERPL-MCNC: 63 MG/DL
HDLC SERPL: 2.9 (ref 0–5)
LDLC SERPL CALC-MCNC: 104.4 MG/DL (ref 0–100)
POTASSIUM SERPL-SCNC: 3.8 MMOL/L (ref 3.5–5.1)
PROT SERPL-MCNC: 7.1 G/DL (ref 6.4–8.2)
SODIUM SERPL-SCNC: 139 MMOL/L (ref 136–145)
TRIGL SERPL-MCNC: 83 MG/DL
VLDLC SERPL CALC-MCNC: 16.6 MG/DL

## 2024-06-11 DIAGNOSIS — E11.9 TYPE 2 DIABETES MELLITUS WITHOUT COMPLICATION, WITHOUT LONG-TERM CURRENT USE OF INSULIN (HCC): ICD-10-CM

## 2024-06-11 NOTE — TELEPHONE ENCOUNTER
PCP: Pepper Meyer DO    Last Visit 5/9/2024   No future appointments.    Requested Prescriptions     Pending Prescriptions Disp Refills    Semaglutide,0.25 or 0.5MG/DOS, (OZEMPIC, 0.25 OR 0.5 MG/DOSE,) 2 MG/3ML SOPN [Pharmacy Med Name: OZEMPIC 0.25 OR 0.5MG/[id number] 3ML] 3 mL 0     Sig: INJECT 0.25 MG UNDER THE SKIN EVERY 7 DAYS FOR 4 WEEKS THEN 0.5 MG EVERY 7 DAYS         Other Comments: Last Refill   05/09/2024

## 2024-06-12 RX ORDER — SEMAGLUTIDE 0.68 MG/ML
0.5 INJECTION, SOLUTION SUBCUTANEOUS
Qty: 3 ML | Refills: 0 | Status: SHIPPED | OUTPATIENT
Start: 2024-06-12

## 2024-07-16 DIAGNOSIS — E11.9 TYPE 2 DIABETES MELLITUS WITHOUT COMPLICATION, WITHOUT LONG-TERM CURRENT USE OF INSULIN (HCC): ICD-10-CM

## 2024-07-18 RX ORDER — SEMAGLUTIDE 0.68 MG/ML
INJECTION, SOLUTION SUBCUTANEOUS
Qty: 3 ML | Refills: 0 | Status: SHIPPED | OUTPATIENT
Start: 2024-07-18

## 2024-08-19 DIAGNOSIS — E11.9 TYPE 2 DIABETES MELLITUS WITHOUT COMPLICATION, WITHOUT LONG-TERM CURRENT USE OF INSULIN (HCC): ICD-10-CM

## 2024-08-19 RX ORDER — SEMAGLUTIDE 0.68 MG/ML
0.5 INJECTION, SOLUTION SUBCUTANEOUS WEEKLY
Qty: 3 ML | Refills: 0 | Status: SHIPPED | OUTPATIENT
Start: 2024-08-19

## 2024-08-19 NOTE — TELEPHONE ENCOUNTER
PCP: Pepper Meyer DO    Last Visit 5/9/2024   No future appointments.    Requested Prescriptions     Pending Prescriptions Disp Refills    OZEMPIC, 0.25 OR 0.5 MG/DOSE, 2 MG/3ML SOPN [Pharmacy Med Name: OZEMPIC 0.25 OR 0.5MG/[id number] 3ML] 3 mL 0     Sig: INJECT 0.5 MG SUBCUTANEOUS ONCE WEEKLY         Other Comments: Last Refill   07/18/24

## 2024-09-24 DIAGNOSIS — E11.9 TYPE 2 DIABETES MELLITUS WITHOUT COMPLICATION, WITHOUT LONG-TERM CURRENT USE OF INSULIN (HCC): ICD-10-CM

## 2024-09-24 RX ORDER — SEMAGLUTIDE 0.68 MG/ML
INJECTION, SOLUTION SUBCUTANEOUS
Qty: 3 ML | Refills: 0 | OUTPATIENT
Start: 2024-09-24

## 2024-09-24 NOTE — TELEPHONE ENCOUNTER
PCP: Pepper Meyer DO    Last Visit 5/9/2024   No future appointments.    Requested Prescriptions     Pending Prescriptions Disp Refills    OZEMPIC, 0.25 OR 0.5 MG/DOSE, 2 MG/3ML SOPN [Pharmacy Med Name: OZEMPIC 0.25 OR 0.5MG DOS(2MG/3ML)] 3 mL 0     Sig: INJECT 0.5 MG UNDER THE SKIN EVERY WEEK. FOLLOW UP FOR REFILLS         Other Comments: Last Refill   08/19/24

## 2024-09-25 RX ORDER — SEMAGLUTIDE 0.68 MG/ML
0.5 INJECTION, SOLUTION SUBCUTANEOUS WEEKLY
Qty: 3 ML | Refills: 0 | Status: SHIPPED | OUTPATIENT
Start: 2024-09-25 | End: 2024-09-26 | Stop reason: SDUPTHER

## 2024-09-26 ENCOUNTER — OFFICE VISIT (OUTPATIENT)
Dept: PRIMARY CARE CLINIC | Facility: CLINIC | Age: 44
End: 2024-09-26
Payer: COMMERCIAL

## 2024-09-26 VITALS
HEART RATE: 75 BPM | TEMPERATURE: 97.6 F | OXYGEN SATURATION: 98 % | WEIGHT: 161.6 LBS | BODY MASS INDEX: 29.74 KG/M2 | DIASTOLIC BLOOD PRESSURE: 83 MMHG | RESPIRATION RATE: 17 BRPM | SYSTOLIC BLOOD PRESSURE: 121 MMHG | HEIGHT: 62 IN

## 2024-09-26 DIAGNOSIS — E11.9 TYPE 2 DIABETES MELLITUS WITHOUT COMPLICATION, WITHOUT LONG-TERM CURRENT USE OF INSULIN (HCC): ICD-10-CM

## 2024-09-26 DIAGNOSIS — N92.6 IRREGULAR MENSTRUATION: Primary | ICD-10-CM

## 2024-09-26 DIAGNOSIS — R23.2 HOT FLASHES: ICD-10-CM

## 2024-09-26 DIAGNOSIS — N92.6 IRREGULAR MENSTRUATION: ICD-10-CM

## 2024-09-26 LAB
ALBUMIN SERPL-MCNC: 4.4 G/DL (ref 3.5–5)
ALBUMIN/GLOB SERPL: 1.3 (ref 1.1–2.2)
ALP SERPL-CCNC: 74 U/L (ref 45–117)
ALT SERPL-CCNC: 18 U/L (ref 12–78)
ANION GAP SERPL CALC-SCNC: 7 MMOL/L (ref 2–12)
AST SERPL-CCNC: 18 U/L (ref 15–37)
BASOPHILS # BLD: 0.1 K/UL (ref 0–0.1)
BASOPHILS NFR BLD: 1 % (ref 0–1)
BILIRUB SERPL-MCNC: 0.8 MG/DL (ref 0.2–1)
BUN SERPL-MCNC: 10 MG/DL (ref 6–20)
BUN/CREAT SERPL: 12 (ref 12–20)
CALCIUM SERPL-MCNC: 9.4 MG/DL (ref 8.5–10.1)
CHLORIDE SERPL-SCNC: 101 MMOL/L (ref 97–108)
CO2 SERPL-SCNC: 28 MMOL/L (ref 21–32)
CREAT SERPL-MCNC: 0.81 MG/DL (ref 0.55–1.02)
CREAT UR-MCNC: 218 MG/DL
DIFFERENTIAL METHOD BLD: ABNORMAL
EOSINOPHIL # BLD: 0.1 K/UL (ref 0–0.4)
EOSINOPHIL NFR BLD: 1 % (ref 0–7)
ERYTHROCYTE [DISTWIDTH] IN BLOOD BY AUTOMATED COUNT: 13 % (ref 11.5–14.5)
ERYTHROCYTE [SEDIMENTATION RATE] IN BLOOD: 21 MM/HR (ref 0–20)
EST. AVERAGE GLUCOSE BLD GHB EST-MCNC: 111 MG/DL
FSH SERPL-ACNC: 29.4 MIU/ML
GLOBULIN SER CALC-MCNC: 3.4 G/DL (ref 2–4)
GLUCOSE SERPL-MCNC: 83 MG/DL (ref 65–100)
HBA1C MFR BLD: 5.5 % (ref 4–5.6)
HCG SERPL QL: NEGATIVE
HCT VFR BLD AUTO: 37.2 % (ref 35–47)
HGB BLD-MCNC: 12.2 G/DL (ref 11.5–16)
IMM GRANULOCYTES # BLD AUTO: 0.1 K/UL (ref 0–0.04)
IMM GRANULOCYTES NFR BLD AUTO: 1 % (ref 0–0.5)
LH SERPL-ACNC: 21.3 MIU/ML
LYMPHOCYTES # BLD: 2.4 K/UL (ref 0.8–3.5)
LYMPHOCYTES NFR BLD: 36 % (ref 12–49)
MCH RBC QN AUTO: 30.5 PG (ref 26–34)
MCHC RBC AUTO-ENTMCNC: 32.8 G/DL (ref 30–36.5)
MCV RBC AUTO: 93 FL (ref 80–99)
MICROALBUMIN UR-MCNC: 2.1 MG/DL
MICROALBUMIN/CREAT UR-RTO: 10 MG/G (ref 0–30)
MONOCYTES # BLD: 0.7 K/UL (ref 0–1)
MONOCYTES NFR BLD: 11 % (ref 5–13)
NEUTS SEG # BLD: 3.4 K/UL (ref 1.8–8)
NEUTS SEG NFR BLD: 50 % (ref 32–75)
NRBC # BLD: 0 K/UL (ref 0–0.01)
NRBC BLD-RTO: 0 PER 100 WBC
PLATELET # BLD AUTO: 258 K/UL (ref 150–400)
PMV BLD AUTO: 10.9 FL (ref 8.9–12.9)
POTASSIUM SERPL-SCNC: 3.4 MMOL/L (ref 3.5–5.1)
PROT SERPL-MCNC: 7.8 G/DL (ref 6.4–8.2)
RBC # BLD AUTO: 4 M/UL (ref 3.8–5.2)
SODIUM SERPL-SCNC: 136 MMOL/L (ref 136–145)
TSH SERPL DL<=0.05 MIU/L-ACNC: 0.47 UIU/ML (ref 0.36–3.74)
WBC # BLD AUTO: 6.7 K/UL (ref 3.6–11)

## 2024-09-26 PROCEDURE — 3044F HG A1C LEVEL LT 7.0%: CPT | Performed by: FAMILY MEDICINE

## 2024-09-26 PROCEDURE — 99214 OFFICE O/P EST MOD 30 MIN: CPT | Performed by: FAMILY MEDICINE

## 2024-09-26 RX ORDER — FLUTICASONE PROPIONATE 50 MCG
2 SPRAY, SUSPENSION (ML) NASAL DAILY PRN
Qty: 16 G | Refills: 5 | Status: SHIPPED | OUTPATIENT
Start: 2024-09-26

## 2024-09-26 RX ORDER — SEMAGLUTIDE 0.68 MG/ML
0.5 INJECTION, SOLUTION SUBCUTANEOUS WEEKLY
Qty: 9 ML | Refills: 1 | Status: SHIPPED | OUTPATIENT
Start: 2024-09-26

## 2024-09-26 RX ORDER — ATORVASTATIN CALCIUM 20 MG/1
20 TABLET, FILM COATED ORAL NIGHTLY
Qty: 90 TABLET | Refills: 3 | Status: SHIPPED | OUTPATIENT
Start: 2024-09-26

## 2024-09-26 SDOH — ECONOMIC STABILITY: FOOD INSECURITY: WITHIN THE PAST 12 MONTHS, THE FOOD YOU BOUGHT JUST DIDN'T LAST AND YOU DIDN'T HAVE MONEY TO GET MORE.: NEVER TRUE

## 2024-09-26 SDOH — ECONOMIC STABILITY: FOOD INSECURITY: WITHIN THE PAST 12 MONTHS, YOU WORRIED THAT YOUR FOOD WOULD RUN OUT BEFORE YOU GOT MONEY TO BUY MORE.: NEVER TRUE

## 2024-09-26 SDOH — ECONOMIC STABILITY: INCOME INSECURITY: HOW HARD IS IT FOR YOU TO PAY FOR THE VERY BASICS LIKE FOOD, HOUSING, MEDICAL CARE, AND HEATING?: NOT HARD AT ALL

## 2024-09-26 ASSESSMENT — PATIENT HEALTH QUESTIONNAIRE - PHQ9
SUM OF ALL RESPONSES TO PHQ9 QUESTIONS 1 & 2: 0
2. FEELING DOWN, DEPRESSED OR HOPELESS: NOT AT ALL
1. LITTLE INTEREST OR PLEASURE IN DOING THINGS: NOT AT ALL
SUM OF ALL RESPONSES TO PHQ QUESTIONS 1-9: 0

## 2024-11-06 ENCOUNTER — APPOINTMENT (OUTPATIENT)
Facility: HOSPITAL | Age: 44
End: 2024-11-06
Payer: COMMERCIAL

## 2024-11-06 ENCOUNTER — HOSPITAL ENCOUNTER (EMERGENCY)
Facility: HOSPITAL | Age: 44
Discharge: HOME | End: 2024-11-06
Attending: EMERGENCY MEDICINE
Payer: COMMERCIAL

## 2024-11-06 VITALS
SYSTOLIC BLOOD PRESSURE: 122 MMHG | WEIGHT: 163.58 LBS | BODY MASS INDEX: 30.1 KG/M2 | HEART RATE: 88 BPM | DIASTOLIC BLOOD PRESSURE: 78 MMHG | HEIGHT: 62 IN | OXYGEN SATURATION: 98 % | RESPIRATION RATE: 20 BRPM | TEMPERATURE: 98.2 F

## 2024-11-06 DIAGNOSIS — H81.10 BENIGN PAROXYSMAL POSITIONAL VERTIGO, UNSPECIFIED LATERALITY: Primary | ICD-10-CM

## 2024-11-06 DIAGNOSIS — R42 LIGHTHEADEDNESS: ICD-10-CM

## 2024-11-06 DIAGNOSIS — R13.12 OROPHARYNGEAL DYSPHAGIA: ICD-10-CM

## 2024-11-06 LAB
ALBUMIN SERPL-MCNC: 4 G/DL (ref 3.5–5)
ALBUMIN/GLOB SERPL: 1.2 (ref 1.1–2.2)
ALP SERPL-CCNC: 65 U/L (ref 45–117)
ALT SERPL-CCNC: 17 U/L (ref 12–78)
ANION GAP SERPL CALC-SCNC: 4 MMOL/L (ref 2–12)
AST SERPL-CCNC: 12 U/L (ref 15–37)
BILIRUB SERPL-MCNC: 0.5 MG/DL (ref 0.2–1)
BUN SERPL-MCNC: 12 MG/DL (ref 6–20)
BUN/CREAT SERPL: 15 (ref 12–20)
CALCIUM SERPL-MCNC: 8.9 MG/DL (ref 8.5–10.1)
CHLORIDE SERPL-SCNC: 108 MMOL/L (ref 97–108)
CO2 SERPL-SCNC: 27 MMOL/L (ref 21–32)
COMMENT:: NORMAL
CREAT SERPL-MCNC: 0.82 MG/DL (ref 0.55–1.02)
ERYTHROCYTE [DISTWIDTH] IN BLOOD BY AUTOMATED COUNT: 14 % (ref 11.5–14.5)
GLOBULIN SER CALC-MCNC: 3.4 G/DL (ref 2–4)
GLUCOSE SERPL-MCNC: 101 MG/DL (ref 65–100)
HCT VFR BLD AUTO: 37.7 % (ref 35–47)
HGB BLD-MCNC: 12.3 G/DL (ref 11.5–16)
MCH RBC QN AUTO: 30.7 PG (ref 26–34)
MCHC RBC AUTO-ENTMCNC: 32.6 G/DL (ref 30–36.5)
MCV RBC AUTO: 94 FL (ref 80–99)
NRBC # BLD: 0 K/UL (ref 0–0.01)
NRBC BLD-RTO: 0 PER 100 WBC
PLATELET # BLD AUTO: 296 K/UL (ref 150–400)
PMV BLD AUTO: 9.7 FL (ref 8.9–12.9)
POTASSIUM SERPL-SCNC: 3.6 MMOL/L (ref 3.5–5.1)
PROT SERPL-MCNC: 7.4 G/DL (ref 6.4–8.2)
RBC # BLD AUTO: 4.01 M/UL (ref 3.8–5.2)
SODIUM SERPL-SCNC: 139 MMOL/L (ref 136–145)
SPECIMEN HOLD: NORMAL
TROPONIN I SERPL HS-MCNC: <4 NG/L (ref 0–51)
WBC # BLD AUTO: 8.8 K/UL (ref 3.6–11)

## 2024-11-06 PROCEDURE — 84484 ASSAY OF TROPONIN QUANT: CPT

## 2024-11-06 PROCEDURE — 80053 COMPREHEN METABOLIC PANEL: CPT

## 2024-11-06 PROCEDURE — 99285 EMERGENCY DEPT VISIT HI MDM: CPT

## 2024-11-06 PROCEDURE — 6370000000 HC RX 637 (ALT 250 FOR IP): Performed by: EMERGENCY MEDICINE

## 2024-11-06 PROCEDURE — 36415 COLL VENOUS BLD VENIPUNCTURE: CPT

## 2024-11-06 PROCEDURE — 93005 ELECTROCARDIOGRAM TRACING: CPT | Performed by: EMERGENCY MEDICINE

## 2024-11-06 PROCEDURE — 6360000002 HC RX W HCPCS: Performed by: EMERGENCY MEDICINE

## 2024-11-06 PROCEDURE — 85027 COMPLETE CBC AUTOMATED: CPT

## 2024-11-06 PROCEDURE — 6360000004 HC RX CONTRAST MEDICATION: Performed by: EMERGENCY MEDICINE

## 2024-11-06 PROCEDURE — 70450 CT HEAD/BRAIN W/O DYE: CPT

## 2024-11-06 PROCEDURE — 96374 THER/PROPH/DIAG INJ IV PUSH: CPT

## 2024-11-06 PROCEDURE — 70496 CT ANGIOGRAPHY HEAD: CPT

## 2024-11-06 RX ORDER — MECLIZINE HCL 12.5 MG 12.5 MG/1
50 TABLET ORAL
Status: COMPLETED | OUTPATIENT
Start: 2024-11-06 | End: 2024-11-06

## 2024-11-06 RX ORDER — ACETAMINOPHEN 500 MG
1000 TABLET ORAL EVERY 6 HOURS PRN
Qty: 40 TABLET | Refills: 0 | Status: SHIPPED | OUTPATIENT
Start: 2024-11-06

## 2024-11-06 RX ORDER — IBUPROFEN 800 MG/1
800 TABLET, FILM COATED ORAL EVERY 6 HOURS PRN
Qty: 21 TABLET | Refills: 0 | Status: SHIPPED | OUTPATIENT
Start: 2024-11-06

## 2024-11-06 RX ORDER — MECLIZINE HYDROCHLORIDE 25 MG/1
25 TABLET ORAL 3 TIMES DAILY PRN
Qty: 30 TABLET | Refills: 0 | Status: SHIPPED | OUTPATIENT
Start: 2024-11-06 | End: 2024-11-16

## 2024-11-06 RX ORDER — IOPAMIDOL 755 MG/ML
100 INJECTION, SOLUTION INTRAVASCULAR
Status: COMPLETED | OUTPATIENT
Start: 2024-11-06 | End: 2024-11-06

## 2024-11-06 RX ORDER — KETOROLAC TROMETHAMINE 30 MG/ML
15 INJECTION, SOLUTION INTRAMUSCULAR; INTRAVENOUS
Status: COMPLETED | OUTPATIENT
Start: 2024-11-06 | End: 2024-11-06

## 2024-11-06 RX ADMIN — MECLIZINE 50 MG: 12.5 TABLET ORAL at 21:37

## 2024-11-06 RX ADMIN — IOPAMIDOL 100 ML: 755 INJECTION, SOLUTION INTRAVENOUS at 21:20

## 2024-11-06 RX ADMIN — KETOROLAC TROMETHAMINE 15 MG: 30 INJECTION, SOLUTION INTRAMUSCULAR at 21:40

## 2024-11-06 ASSESSMENT — PAIN - FUNCTIONAL ASSESSMENT
PAIN_FUNCTIONAL_ASSESSMENT: ACTIVITIES ARE NOT PREVENTED
PAIN_FUNCTIONAL_ASSESSMENT: ACTIVITIES ARE NOT PREVENTED
PAIN_FUNCTIONAL_ASSESSMENT: 0-10

## 2024-11-06 ASSESSMENT — PAIN DESCRIPTION - ONSET: ONSET: ON-GOING

## 2024-11-06 ASSESSMENT — PAIN DESCRIPTION - ORIENTATION
ORIENTATION: RIGHT
ORIENTATION: POSTERIOR

## 2024-11-06 ASSESSMENT — PAIN SCALES - GENERAL
PAINLEVEL_OUTOF10: 2
PAINLEVEL_OUTOF10: 2

## 2024-11-06 ASSESSMENT — PAIN DESCRIPTION - PAIN TYPE: TYPE: ACUTE PAIN

## 2024-11-06 ASSESSMENT — PAIN DESCRIPTION - LOCATION
LOCATION: NECK
LOCATION: ARM;ELBOW;WRIST

## 2024-11-06 ASSESSMENT — PAIN DESCRIPTION - FREQUENCY: FREQUENCY: CONTINUOUS

## 2024-11-06 ASSESSMENT — PAIN DESCRIPTION - DESCRIPTORS
DESCRIPTORS: ACHING
DESCRIPTORS: TENDER

## 2024-11-07 LAB
EKG ATRIAL RATE: 74 BPM
EKG DIAGNOSIS: NORMAL
EKG P AXIS: 48 DEGREES
EKG P-R INTERVAL: 190 MS
EKG Q-T INTERVAL: 374 MS
EKG QRS DURATION: 70 MS
EKG QTC CALCULATION (BAZETT): 415 MS
EKG R AXIS: 27 DEGREES
EKG T AXIS: 13 DEGREES
EKG VENTRICULAR RATE: 74 BPM

## 2024-11-07 NOTE — ED PROVIDER NOTES
Head: Normocephalic and atraumatic.   Eyes:      Extraocular Movements: Extraocular movements intact.      Pupils: Pupils are equal, round, and reactive to light.   Cardiovascular:      Rate and Rhythm: Normal rate and regular rhythm.   Pulmonary:      Effort: Pulmonary effort is normal.   Skin:     General: Skin is warm and dry.   Neurological:      Mental Status: She is alert and oriented to person, place, and time.      Motor: Motor function is intact.      Coordination: Coordination is intact. Coordination normal. Finger-Nose-Finger Test normal. Rapid alternating movements normal.         DIAGNOSTIC RESULTS     EKG: All EKG's are interpreted by the Emergency Department Physician who either signs or Co-signs this chart in the absence of a cardiologist.        RADIOLOGY:   Plain radiographic images, CT and ultrasound are visualized and preliminarily interpreted by the emergency physician as documented in clinical course.    Interpretation per the Radiologist below, if available at the time of this note:    CTA HEAD NECK W CONTRAST   Final Result         1. No acute large vessel arterial occlusion, significant stenosis, or   intracranial aneurysm.               Electronically signed by Geovanna Patel      CT HEAD WO CONTRAST   Final Result      1.   No acute intracranial findings.            Electronically signed by Cleve Sandoval           LABS:  Labs Reviewed   COMPREHENSIVE METABOLIC PANEL - Abnormal; Notable for the following components:       Result Value    Glucose 101 (*)     AST 12 (*)     All other components within normal limits   CBC   TROPONIN   EXTRA TUBES HOLD       All other labs were within normal range or not returned as of this dictation.    EMERGENCY DEPARTMENT COURSE and DIFFERENTIAL DIAGNOSIS/MDM:   Vitals:    Vitals:    11/06/24 1924   BP: 122/78   Pulse: 88   Resp: 20   Temp: 98.2 °F (36.8 °C)   TempSrc: Oral   SpO2: 98%   Weight: 74.2 kg (163 lb 9.3 oz)   Height: 1.575 m (5' 2\")

## 2024-11-07 NOTE — ED TRIAGE NOTES
Patient started with vertigo symptoms on Saturday and the symptoms keep getting. Patient is also complaining of right arm and leg weakness that started last weekend. Difficulty swallowing intermittent. Aphasia started on Monday also.

## 2024-11-25 ENCOUNTER — APPOINTMENT (OUTPATIENT)
Facility: HOSPITAL | Age: 44
End: 2024-11-25
Payer: COMMERCIAL

## 2024-11-25 ENCOUNTER — HOSPITAL ENCOUNTER (EMERGENCY)
Facility: HOSPITAL | Age: 44
Discharge: HOME | End: 2024-11-25
Attending: STUDENT IN AN ORGANIZED HEALTH CARE EDUCATION/TRAINING PROGRAM
Payer: COMMERCIAL

## 2024-11-25 VITALS
OXYGEN SATURATION: 98 % | SYSTOLIC BLOOD PRESSURE: 136 MMHG | BODY MASS INDEX: 29.56 KG/M2 | WEIGHT: 161.6 LBS | HEART RATE: 73 BPM | TEMPERATURE: 98.6 F | DIASTOLIC BLOOD PRESSURE: 84 MMHG | RESPIRATION RATE: 17 BRPM

## 2024-11-25 DIAGNOSIS — M62.838 SPASM OF MUSCLE: Primary | ICD-10-CM

## 2024-11-25 LAB
ALBUMIN SERPL-MCNC: 3.8 G/DL (ref 3.5–5)
ALBUMIN/GLOB SERPL: 1 (ref 1.1–2.2)
ALP SERPL-CCNC: 63 U/L (ref 45–117)
ALT SERPL-CCNC: 17 U/L (ref 12–78)
ANION GAP SERPL CALC-SCNC: 5 MMOL/L (ref 2–12)
AST SERPL-CCNC: 14 U/L (ref 15–37)
BASOPHILS # BLD: 0.1 K/UL (ref 0–0.1)
BASOPHILS NFR BLD: 1 % (ref 0–1)
BILIRUB SERPL-MCNC: 1 MG/DL (ref 0.2–1)
BUN SERPL-MCNC: 10 MG/DL (ref 6–20)
BUN/CREAT SERPL: 15 (ref 12–20)
CALCIUM SERPL-MCNC: 8.5 MG/DL (ref 8.5–10.1)
CHLORIDE SERPL-SCNC: 108 MMOL/L (ref 97–108)
CO2 SERPL-SCNC: 24 MMOL/L (ref 21–32)
COMMENT:: NORMAL
CREAT SERPL-MCNC: 0.68 MG/DL (ref 0.55–1.02)
DIFFERENTIAL METHOD BLD: NORMAL
EOSINOPHIL # BLD: 0.2 K/UL (ref 0–0.4)
EOSINOPHIL NFR BLD: 2 % (ref 0–7)
ERYTHROCYTE [DISTWIDTH] IN BLOOD BY AUTOMATED COUNT: 13.3 % (ref 11.5–14.5)
GLOBULIN SER CALC-MCNC: 3.7 G/DL (ref 2–4)
GLUCOSE SERPL-MCNC: 99 MG/DL (ref 65–100)
HCG SERPL-ACNC: <1 MIU/ML (ref 0–6)
HCT VFR BLD AUTO: 35.9 % (ref 35–47)
HGB BLD-MCNC: 11.9 G/DL (ref 11.5–16)
IMM GRANULOCYTES # BLD AUTO: 0 K/UL (ref 0–0.04)
IMM GRANULOCYTES NFR BLD AUTO: 0 % (ref 0–0.5)
LYMPHOCYTES # BLD: 2.7 K/UL (ref 0.8–3.5)
LYMPHOCYTES NFR BLD: 40 % (ref 12–49)
MAGNESIUM SERPL-MCNC: 1.9 MG/DL (ref 1.6–2.4)
MCH RBC QN AUTO: 30.5 PG (ref 26–34)
MCHC RBC AUTO-ENTMCNC: 33.1 G/DL (ref 30–36.5)
MCV RBC AUTO: 92.1 FL (ref 80–99)
MONOCYTES # BLD: 0.6 K/UL (ref 0–1)
MONOCYTES NFR BLD: 10 % (ref 5–13)
NEUTS SEG # BLD: 3.2 K/UL (ref 1.8–8)
NEUTS SEG NFR BLD: 47 % (ref 32–75)
NRBC # BLD: 0 K/UL (ref 0–0.01)
NRBC BLD-RTO: 0 PER 100 WBC
PLATELET # BLD AUTO: 278 K/UL (ref 150–400)
PMV BLD AUTO: 10 FL (ref 8.9–12.9)
POTASSIUM SERPL-SCNC: 3.6 MMOL/L (ref 3.5–5.1)
PROT SERPL-MCNC: 7.5 G/DL (ref 6.4–8.2)
RBC # BLD AUTO: 3.9 M/UL (ref 3.8–5.2)
SODIUM SERPL-SCNC: 137 MMOL/L (ref 136–145)
SPECIMEN HOLD: NORMAL
WBC # BLD AUTO: 6.7 K/UL (ref 3.6–11)

## 2024-11-25 PROCEDURE — 80053 COMPREHEN METABOLIC PANEL: CPT

## 2024-11-25 PROCEDURE — 85025 COMPLETE CBC W/AUTO DIFF WBC: CPT

## 2024-11-25 PROCEDURE — 70450 CT HEAD/BRAIN W/O DYE: CPT

## 2024-11-25 PROCEDURE — 83735 ASSAY OF MAGNESIUM: CPT

## 2024-11-25 PROCEDURE — 99285 EMERGENCY DEPT VISIT HI MDM: CPT

## 2024-11-25 PROCEDURE — 84702 CHORIONIC GONADOTROPIN TEST: CPT

## 2024-11-25 PROCEDURE — 6360000002 HC RX W HCPCS: Performed by: STUDENT IN AN ORGANIZED HEALTH CARE EDUCATION/TRAINING PROGRAM

## 2024-11-25 PROCEDURE — 96374 THER/PROPH/DIAG INJ IV PUSH: CPT

## 2024-11-25 PROCEDURE — 36415 COLL VENOUS BLD VENIPUNCTURE: CPT

## 2024-11-25 PROCEDURE — 6360000004 HC RX CONTRAST MEDICATION: Performed by: RADIOLOGY

## 2024-11-25 PROCEDURE — 74177 CT ABD & PELVIS W/CONTRAST: CPT

## 2024-11-25 PROCEDURE — 96375 TX/PRO/DX INJ NEW DRUG ADDON: CPT

## 2024-11-25 RX ORDER — DIAZEPAM 10 MG/2ML
5 INJECTION, SOLUTION INTRAMUSCULAR; INTRAVENOUS ONCE
Status: COMPLETED | OUTPATIENT
Start: 2024-11-25 | End: 2024-11-25

## 2024-11-25 RX ORDER — DIAZEPAM 5 MG/1
5 TABLET ORAL EVERY 6 HOURS PRN
Qty: 12 TABLET | Refills: 0 | Status: SHIPPED | OUTPATIENT
Start: 2024-11-25 | End: 2024-11-25

## 2024-11-25 RX ORDER — KETOROLAC TROMETHAMINE 30 MG/ML
15 INJECTION, SOLUTION INTRAMUSCULAR; INTRAVENOUS ONCE
Status: COMPLETED | OUTPATIENT
Start: 2024-11-25 | End: 2024-11-25

## 2024-11-25 RX ORDER — DIAZEPAM 5 MG/1
5 TABLET ORAL EVERY 6 HOURS PRN
Qty: 12 TABLET | Refills: 0 | Status: SHIPPED | OUTPATIENT
Start: 2024-11-25 | End: 2024-12-05

## 2024-11-25 RX ORDER — IOPAMIDOL 755 MG/ML
100 INJECTION, SOLUTION INTRAVASCULAR
Status: COMPLETED | OUTPATIENT
Start: 2024-11-25 | End: 2024-11-25

## 2024-11-25 RX ADMIN — KETOROLAC TROMETHAMINE 15 MG: 30 INJECTION, SOLUTION INTRAMUSCULAR at 12:02

## 2024-11-25 RX ADMIN — IOPAMIDOL 100 ML: 755 INJECTION, SOLUTION INTRAVENOUS at 14:40

## 2024-11-25 RX ADMIN — DIAZEPAM 5 MG: 5 INJECTION, SOLUTION INTRAMUSCULAR; INTRAVENOUS at 12:39

## 2024-11-25 ASSESSMENT — PAIN DESCRIPTION - ORIENTATION
ORIENTATION: OTHER (COMMENT)
ORIENTATION: LEFT

## 2024-11-25 ASSESSMENT — PAIN - FUNCTIONAL ASSESSMENT
PAIN_FUNCTIONAL_ASSESSMENT: 0-10
PAIN_FUNCTIONAL_ASSESSMENT: PREVENTS OR INTERFERES SOME ACTIVE ACTIVITIES AND ADLS

## 2024-11-25 ASSESSMENT — PAIN DESCRIPTION - DESCRIPTORS: DESCRIPTORS: OTHER (COMMENT)

## 2024-11-25 ASSESSMENT — PAIN SCALES - GENERAL
PAINLEVEL_OUTOF10: 5
PAINLEVEL_OUTOF10: 0

## 2024-11-25 ASSESSMENT — PAIN DESCRIPTION - LOCATION
LOCATION: HIP
LOCATION: ABDOMEN

## 2024-11-25 NOTE — ED TRIAGE NOTES
Pt c/o left buttock pain radiating down left leg since last Wednesday , radiating down into her pelvic area and radiating into her abd and chest, all of her joints are aching, +bilateral arm weakness, stated she is having facial \"pulling\" and she gets tired when it happens, affects her speech and concentration, pt with intermittent facial spasms

## 2024-11-25 NOTE — DISCHARGE INSTRUCTIONS
Take the prescribed medication Valium as needed for the muscle spasms.  Return to the emergency department if symptoms are not controlled, or if you have any changing or worsening symptoms.  Follow-up with the outpatient doctors such as the GI and neurology doctors using the number provided.

## 2024-11-26 NOTE — ED PROVIDER NOTES
[facility] EMERGENCY DEP  EMERGENCY DEPARTMENT ENCOUNTER      Pt Name: Amy Gutierrez  MRN: [id number]  Birthdate 1980  Date of evaluation: 11/25/2024  Provider: Attila Victoria MD    CHIEF COMPLAINT       Chief Complaint   Patient presents with    Abdominal Pain       HISTORY OF PRESENT ILLNESS    HPI    44f hx anxiety dm vargas RLS, tia, DM here for 2 complaints.    First she reports spasm and discomfort to the right side of her face. States this is a recurrent issue for her, occurs at random. This episode started yesterday and seems worse than usual. For this issue she has been seen several times and followed with neurology. Was told it was muscle spasm - states she was ruled out for lupus, myasthenia and various other conditions. Was managed on flexeril but hasn't taken it in a few days. Denies known triggers.    She is also reporting similar discomfort and tightness and pain in the LLQ abdomen radiating into her left hip. Skin over this area feels burning and itchy. Denies rash or new exposures. Normal BM, urination. Denies abnormal vaginal bleeding.     Nursing notes reviewed.    REVIEW OF SYSTEMS     Review of Systems  Unless otherwise stated, a complete review of systems was asked of the patient. Pertinent positives are noted in the HPI section.    PAST MEDICAL HISTORY     Past Medical History:   Diagnosis Date    Anxiety 2016    Diabetes (HCC)     Headache     Hyperlipidemia     Obesity     Restless legs syndrome Since a child    TIA (transient ischemic attack) 6/7/2016    Type 2 diabetes mellitus without complication (HCC) 2016    Vertigo        SURGICAL HISTORY       Past Surgical History:   Procedure Laterality Date    ROTATOR CUFF REPAIR  03/15/2022    R side    TUBAL LIGATION  2009    WISDOM TOOTH EXTRACTION         CURRENT MEDICATIONS       Discharge Medication List as of 11/25/2024  3:03 PM        CONTINUE these medications which have NOT CHANGED    Details   acetaminophen (TYLENOL) 500 MG

## 2024-12-16 ENCOUNTER — TELEPHONE (OUTPATIENT)
Dept: PRIMARY CARE CLINIC | Facility: CLINIC | Age: 44
End: 2024-12-16

## 2024-12-16 NOTE — TELEPHONE ENCOUNTER
----- Message from Benjamín SINGH sent at 12/13/2024  1:40 PM EST -----  Regarding: [facility] Appointment Request  [facility] Appointment Request    Patient needs appointment for [facility] Appointment Type: New Patient.    Patient Requested Dates(s): January   Patient Requested Time: any time  Provider Name any provider    Reason for Appointment Request: New Patient - Available appointments did not meet patient need. Medication refill  --------------------------------------------------------------------------------------------------------------------------    Relationship to Patient: Self     Call Back Information: OK to leave message on voicemail  Preferred Call Back Number: Phone [phone]

## 2025-01-31 ENCOUNTER — TELEPHONE (OUTPATIENT)
Dept: PRIMARY CARE CLINIC | Facility: CLINIC | Age: 45
End: 2025-01-31

## 2025-01-31 NOTE — TELEPHONE ENCOUNTER
Called Patient and LVM stating that on our end Ozempic has been approved but could take sometime on the pharmacies end

## 2025-01-31 NOTE — TELEPHONE ENCOUNTER
Please call patient about PA on Ozempic.  She does not need a refill, she says Dr Meyre wrote her prescription to last but the PA form  needs to be completed.  She would like a follow up call.

## 2025-02-11 ENCOUNTER — OFFICE VISIT (OUTPATIENT)
Dept: PRIMARY CARE CLINIC | Facility: CLINIC | Age: 45
End: 2025-02-11
Payer: COMMERCIAL

## 2025-02-11 VITALS
SYSTOLIC BLOOD PRESSURE: 112 MMHG | TEMPERATURE: 97.7 F | DIASTOLIC BLOOD PRESSURE: 63 MMHG | HEIGHT: 62 IN | RESPIRATION RATE: 17 BRPM | WEIGHT: 157.8 LBS | BODY MASS INDEX: 29.04 KG/M2 | HEART RATE: 77 BPM | OXYGEN SATURATION: 100 %

## 2025-02-11 DIAGNOSIS — E78.5 HYPERLIPIDEMIA ASSOCIATED WITH TYPE 2 DIABETES MELLITUS (HCC): ICD-10-CM

## 2025-02-11 DIAGNOSIS — E11.9 TYPE 2 DIABETES MELLITUS WITHOUT COMPLICATION, WITHOUT LONG-TERM CURRENT USE OF INSULIN (HCC): Primary | ICD-10-CM

## 2025-02-11 DIAGNOSIS — N95.1 PERIMENOPAUSAL VASOMOTOR SYMPTOMS: ICD-10-CM

## 2025-02-11 DIAGNOSIS — E11.69 HYPERLIPIDEMIA ASSOCIATED WITH TYPE 2 DIABETES MELLITUS (HCC): ICD-10-CM

## 2025-02-11 DIAGNOSIS — F32.9 REACTIVE DEPRESSION: ICD-10-CM

## 2025-02-11 DIAGNOSIS — G51.31 HEMIFACIAL SPASM OF RIGHT SIDE OF FACE: ICD-10-CM

## 2025-02-11 DIAGNOSIS — Z86.73 HISTORY OF TIA (TRANSIENT ISCHEMIC ATTACK): ICD-10-CM

## 2025-02-11 PROCEDURE — 99214 OFFICE O/P EST MOD 30 MIN: CPT

## 2025-02-11 RX ORDER — DIAZEPAM 5 MG/1
5 TABLET ORAL EVERY 6 HOURS PRN
COMMUNITY
End: 2025-02-11 | Stop reason: SDUPTHER

## 2025-02-11 RX ORDER — DIAZEPAM 5 MG/1
5 TABLET ORAL EVERY 6 HOURS PRN
Qty: 20 TABLET | Refills: 0 | Status: SHIPPED | OUTPATIENT
Start: 2025-02-11 | End: 2025-04-12

## 2025-02-11 RX ORDER — ACETAMINOPHEN AND CODEINE PHOSPHATE 120; 12 MG/5ML; MG/5ML
1 SOLUTION ORAL DAILY
COMMUNITY

## 2025-02-11 SDOH — ECONOMIC STABILITY: FOOD INSECURITY: WITHIN THE PAST 12 MONTHS, YOU WORRIED THAT YOUR FOOD WOULD RUN OUT BEFORE YOU GOT MONEY TO BUY MORE.: NEVER TRUE

## 2025-02-11 SDOH — ECONOMIC STABILITY: FOOD INSECURITY: WITHIN THE PAST 12 MONTHS, THE FOOD YOU BOUGHT JUST DIDN'T LAST AND YOU DIDN'T HAVE MONEY TO GET MORE.: NEVER TRUE

## 2025-02-11 ASSESSMENT — ENCOUNTER SYMPTOMS
CONSTIPATION: 0
NAUSEA: 0
DIARRHEA: 0
VOMITING: 0
COUGH: 0
ABDOMINAL PAIN: 0
WHEEZING: 0
BLOOD IN STOOL: 0
SHORTNESS OF BREATH: 0

## 2025-02-11 ASSESSMENT — PATIENT HEALTH QUESTIONNAIRE - PHQ9
2. FEELING DOWN, DEPRESSED OR HOPELESS: SEVERAL DAYS
SUM OF ALL RESPONSES TO PHQ QUESTIONS 1-9: 2
SUM OF ALL RESPONSES TO PHQ9 QUESTIONS 1 & 2: 2
1. LITTLE INTEREST OR PLEASURE IN DOING THINGS: SEVERAL DAYS

## 2025-02-11 NOTE — PROGRESS NOTES
[facility]   [address], [address]  [place] [address]  P: [phone]  F: [phone]    SUBJECTIVE     HPI:     Amy Gutierrez is a 44 y.o. female who is seen in the clinic for   Chief Complaint   Patient presents with    New Patient     Wanted to let you know she may be going through perimenopause. She has excess moisture In the vaginal area, no sex drive, and etc.         The patient presents to the office today to follow-up on chronic conditions    Type 2 diabetes mellitus without complication, without long-term current use of insulin  Compliant on Ozempic 0.5 mg weekly, denies side effects. She has made significant dietary changes, now mostly eating vegetables and fruit. Does not eat sugar, starch, or gluten. Fasting glucose is around 80s-90s. Postprandial is around 150s-160s. Denies symptoms of hypoglycemia.     Hyperlipidemia associated with type 2 diabetes mellitus  She decided to come off of atorvastatin based on dietary changes and lipids reviewed with neurology.     Depression  She reports feeling depressed with her current medical diagnoses, lifestyle changes, and vasomotor symptoms. She is not interested in medication for this, but will consider counseling.    History of TIA (transient ischemic attack)  Followed by neurology. She was seen at the ER in 11/2024 for TIA-like symptoms, which were determined to be hemifacial spasms. She was prescribed diazepam 5 mg for this and would like a refill today. She has 2 tabs left.    Vasomotor symptoms  She was having significant pelvic pain, so she was started on OCP. She notes her cycle has still been irregular and recently had 16 days of bleeding. She has a decrease libido and excessive moisture in her pelvic area which is uncomfortable. Followed by GYN and scheduled for follow-up in 1 month..     Health screenings:   Eye exam Done about 1 year ago  Dental exam Done 8/2024  PAP smear Done 2024  Mammogram Done 2022, will schedule with

## 2025-02-11 NOTE — PROGRESS NOTES
Health Decision Maker has been checked with the patient          Chief Complaint   Patient presents with    New Patient     Wanted to let you know she may be going through perimenopause. She has excess moisture In the vaginal area, no sex drive, and etc.        \"Have you been to the ER, urgent care clinic since your last visit?  Hospitalized since your last visit?\"    YES, PATIENT WENT TO [facility]    “Have you seen or consulted any other health care providers outside of [facility] since your last visit?”    YES, GYNO AND NEUROLOGIST           Have you had a mammogram?”   YES,         “Have you had a pap smear?”    NO, PATIENT GOES TO [facility] LOCATION    Date of last Cervical Cancer screen (HPV or PAP): 9/23/2016             Click Here for Release of Records Request

## 2025-02-20 ENCOUNTER — TELEPHONE (OUTPATIENT)
Dept: PRIMARY CARE CLINIC | Facility: CLINIC | Age: 45
End: 2025-02-20

## 2025-02-20 DIAGNOSIS — E11.9 TYPE 2 DIABETES MELLITUS WITHOUT COMPLICATION, WITHOUT LONG-TERM CURRENT USE OF INSULIN (HCC): ICD-10-CM

## 2025-02-20 RX ORDER — SEMAGLUTIDE 0.68 MG/ML
0.5 INJECTION, SOLUTION SUBCUTANEOUS WEEKLY
Qty: 9 ML | Refills: 1 | Status: SHIPPED | OUTPATIENT
Start: 2025-02-20

## 2025-02-20 NOTE — TELEPHONE ENCOUNTER
Called pt to get more info with hr insurance for some reason when I was doing PA it would not allow me to submit due to it saying it needed more info about her insurance. Will be calling patient's insurance tomorrow 2/21/25 to do the PA over the phone.

## 2025-02-20 NOTE — TELEPHONE ENCOUNTER
A PA form was filled out by your office on 1/31/25 and submitted to my insurance company to refill my prescription Ozempic. However, the form did not go through on your end according to [facility]. They are advising to call the prior authorization line at [phone]. I have been without my medication for a month now and [facility] is stating this is the quickest way to get the PA processed. Dr. Meyer gave me enough refills to last until I was able to meet with another  Which I just met with you. They are advising that my new DrForest will need to call. Please give them a call at your earliest convenience. Thanks

## 2025-02-28 ENCOUNTER — TELEPHONE (OUTPATIENT)
Dept: PRIMARY CARE CLINIC | Facility: CLINIC | Age: 45
End: 2025-02-28

## 2025-02-28 NOTE — TELEPHONE ENCOUNTER
Patient asked for a nurse to call her back regarding her prior authorization for her Semaglutide   I spoke to Anni and she said she will call her back

## 2025-02-28 NOTE — TELEPHONE ENCOUNTER
Spoke with Teresa that is a rep at the patient's insurance and I got the patient's PA approved. Called patient and let her know as well. Insurance rep stated we would be getting a fax confirmation within 24hrs for proof. Will scan once received.

## 2025-04-29 ENCOUNTER — TELEPHONE (OUTPATIENT)
Dept: PRIMARY CARE CLINIC | Facility: CLINIC | Age: 45
End: 2025-04-29

## 2025-04-29 DIAGNOSIS — E11.9 TYPE 2 DIABETES MELLITUS WITHOUT COMPLICATION, WITHOUT LONG-TERM CURRENT USE OF INSULIN (HCC): ICD-10-CM

## 2025-04-29 NOTE — TELEPHONE ENCOUNTER
[facility] need the rx for Ozempic 0.5mg sent to them on [address] in [place].  Please reach out to [facility] pharmacy if you have any questions.

## 2025-04-30 RX ORDER — SEMAGLUTIDE 0.68 MG/ML
0.5 INJECTION, SOLUTION SUBCUTANEOUS WEEKLY
Qty: 9 ML | Refills: 1 | Status: SHIPPED | OUTPATIENT
Start: 2025-04-30

## 2025-05-01 ENCOUNTER — TELEPHONE (OUTPATIENT)
Dept: PRIMARY CARE CLINIC | Facility: CLINIC | Age: 45
End: 2025-05-01